# Patient Record
Sex: MALE | Race: BLACK OR AFRICAN AMERICAN | Employment: OTHER | URBAN - METROPOLITAN AREA
[De-identification: names, ages, dates, MRNs, and addresses within clinical notes are randomized per-mention and may not be internally consistent; named-entity substitution may affect disease eponyms.]

---

## 2020-10-29 ENCOUNTER — APPOINTMENT (OUTPATIENT)
Dept: GENERAL RADIOLOGY | Age: 60
DRG: 440 | End: 2020-10-29
Attending: EMERGENCY MEDICINE
Payer: COMMERCIAL

## 2020-10-29 ENCOUNTER — APPOINTMENT (OUTPATIENT)
Dept: CT IMAGING | Age: 60
DRG: 440 | End: 2020-10-29
Attending: EMERGENCY MEDICINE
Payer: COMMERCIAL

## 2020-10-29 ENCOUNTER — HOSPITAL ENCOUNTER (INPATIENT)
Age: 60
LOS: 6 days | Discharge: HOME | DRG: 440 | End: 2020-11-04
Attending: EMERGENCY MEDICINE | Admitting: HOSPITALIST
Payer: COMMERCIAL

## 2020-10-29 DIAGNOSIS — K85.90 ACUTE PANCREATITIS, UNSPECIFIED COMPLICATION STATUS, UNSPECIFIED PANCREATITIS TYPE: Primary | ICD-10-CM

## 2020-10-29 LAB
ALBUMIN SERPL-MCNC: 4.1 G/DL (ref 3.5–5)
ALBUMIN/GLOB SERPL: 1.1 {RATIO} (ref 1.1–2.2)
ALP SERPL-CCNC: 68 U/L (ref 45–117)
ALT SERPL-CCNC: 43 U/L (ref 12–78)
ANION GAP SERPL CALC-SCNC: 10 MMOL/L (ref 5–15)
APPEARANCE UR: CLEAR
AST SERPL W P-5'-P-CCNC: 32 U/L (ref 15–37)
BACTERIA URNS QL MICRO: NEGATIVE /HPF
BASOPHILS # BLD: 0 K/UL (ref 0–0.1)
BASOPHILS NFR BLD: 0 % (ref 0–1)
BILIRUB SERPL-MCNC: 0.7 MG/DL (ref 0.2–1)
BILIRUB UR QL: NEGATIVE
BUN SERPL-MCNC: 13 MG/DL (ref 6–20)
BUN/CREAT SERPL: 13 (ref 12–20)
CA-I BLD-MCNC: 9.7 MG/DL (ref 8.5–10.1)
CHLORIDE SERPL-SCNC: 104 MMOL/L (ref 97–108)
CO2 SERPL-SCNC: 27 MMOL/L (ref 21–32)
COLOR UR: ABNORMAL
CREAT SERPL-MCNC: 0.99 MG/DL (ref 0.7–1.3)
DIFFERENTIAL METHOD BLD: ABNORMAL
EOSINOPHIL # BLD: 0 K/UL (ref 0–0.4)
EOSINOPHIL NFR BLD: 0 % (ref 0–7)
EPITH CASTS URNS QL MICRO: NORMAL /LPF
ERYTHROCYTE [DISTWIDTH] IN BLOOD BY AUTOMATED COUNT: 14.3 % (ref 11.5–14.5)
GLOBULIN SER CALC-MCNC: 3.7 G/DL (ref 2–4)
GLUCOSE SERPL-MCNC: 127 MG/DL (ref 65–100)
GLUCOSE UR STRIP.AUTO-MCNC: NEGATIVE MG/DL
HCT VFR BLD AUTO: 38.1 % (ref 36.6–50.3)
HGB BLD-MCNC: 13.2 G/DL (ref 12.1–17)
HGB UR QL STRIP: NEGATIVE
IMM GRANULOCYTES # BLD AUTO: 0 K/UL (ref 0–0.04)
IMM GRANULOCYTES NFR BLD AUTO: 0 % (ref 0–0.5)
KETONES UR QL STRIP.AUTO: 15 MG/DL
LEUKOCYTE ESTERASE UR QL STRIP.AUTO: NEGATIVE
LIPASE SERPL-CCNC: >3000 U/L (ref 73–393)
LYMPHOCYTES # BLD: 2.2 K/UL (ref 0.8–3.5)
LYMPHOCYTES NFR BLD: 17 % (ref 12–49)
MCH RBC QN AUTO: 33.1 PG (ref 26–34)
MCHC RBC AUTO-ENTMCNC: 34.6 G/DL (ref 30–36.5)
MCV RBC AUTO: 95.5 FL (ref 80–99)
MONOCYTES # BLD: 0.8 K/UL (ref 0–1)
MONOCYTES NFR BLD: 6 % (ref 5–13)
NEUTS SEG # BLD: 9.9 K/UL (ref 1.8–8)
NEUTS SEG NFR BLD: 77 % (ref 32–75)
NITRITE UR QL STRIP.AUTO: NEGATIVE
PH UR STRIP: 6 [PH] (ref 5–8)
PLATELET # BLD AUTO: 213 K/UL (ref 150–400)
PMV BLD AUTO: 10.3 FL (ref 8.9–12.9)
POTASSIUM SERPL-SCNC: 4.3 MMOL/L (ref 3.5–5.1)
PROT SERPL-MCNC: 7.8 G/DL (ref 6.4–8.2)
PROT UR STRIP-MCNC: ABNORMAL MG/DL
RBC # BLD AUTO: 3.99 M/UL (ref 4.1–5.7)
RBC #/AREA URNS HPF: NORMAL /HPF (ref 0–5)
SODIUM SERPL-SCNC: 141 MMOL/L (ref 136–145)
SP GR UR REFRACTOMETRY: 1.01 (ref 1–1.03)
UROBILINOGEN UR QL STRIP.AUTO: 1 EU/DL (ref 0.2–1)
WBC # BLD AUTO: 13 K/UL (ref 4.1–11.1)
WBC URNS QL MICRO: NORMAL /HPF (ref 0–4)

## 2020-10-29 PROCEDURE — 36415 COLL VENOUS BLD VENIPUNCTURE: CPT

## 2020-10-29 PROCEDURE — 74011250636 HC RX REV CODE- 250/636: Performed by: EMERGENCY MEDICINE

## 2020-10-29 PROCEDURE — 96374 THER/PROPH/DIAG INJ IV PUSH: CPT

## 2020-10-29 PROCEDURE — 85025 COMPLETE CBC W/AUTO DIFF WBC: CPT

## 2020-10-29 PROCEDURE — 65270000029 HC RM PRIVATE

## 2020-10-29 PROCEDURE — 96375 TX/PRO/DX INJ NEW DRUG ADDON: CPT

## 2020-10-29 PROCEDURE — 74177 CT ABD & PELVIS W/CONTRAST: CPT

## 2020-10-29 PROCEDURE — 80053 COMPREHEN METABOLIC PANEL: CPT

## 2020-10-29 PROCEDURE — 83690 ASSAY OF LIPASE: CPT

## 2020-10-29 PROCEDURE — 81001 URINALYSIS AUTO W/SCOPE: CPT

## 2020-10-29 PROCEDURE — 74018 RADEX ABDOMEN 1 VIEW: CPT

## 2020-10-29 PROCEDURE — 74011000636 HC RX REV CODE- 636: Performed by: HOSPITALIST

## 2020-10-29 PROCEDURE — 99284 EMERGENCY DEPT VISIT MOD MDM: CPT

## 2020-10-29 RX ORDER — HYDROMORPHONE HYDROCHLORIDE 1 MG/ML
0.5 INJECTION, SOLUTION INTRAMUSCULAR; INTRAVENOUS; SUBCUTANEOUS ONCE
Status: COMPLETED | OUTPATIENT
Start: 2020-10-29 | End: 2020-10-29

## 2020-10-29 RX ORDER — SODIUM CHLORIDE 0.9 % (FLUSH) 0.9 %
5-40 SYRINGE (ML) INJECTION EVERY 8 HOURS
Status: DISCONTINUED | OUTPATIENT
Start: 2020-10-30 | End: 2020-10-30

## 2020-10-29 RX ORDER — HYDROMORPHONE HYDROCHLORIDE 1 MG/ML
1 INJECTION, SOLUTION INTRAMUSCULAR; INTRAVENOUS; SUBCUTANEOUS ONCE
Status: COMPLETED | OUTPATIENT
Start: 2020-10-29 | End: 2020-10-29

## 2020-10-29 RX ORDER — ONDANSETRON 2 MG/ML
4 INJECTION INTRAMUSCULAR; INTRAVENOUS
Status: COMPLETED | OUTPATIENT
Start: 2020-10-29 | End: 2020-10-29

## 2020-10-29 RX ORDER — SODIUM CHLORIDE 9 MG/ML
1000 INJECTION, SOLUTION INTRAVENOUS CONTINUOUS
Status: DISCONTINUED | OUTPATIENT
Start: 2020-10-30 | End: 2020-11-04 | Stop reason: HOSPADM

## 2020-10-29 RX ORDER — HYDROMORPHONE HYDROCHLORIDE 1 MG/ML
1 INJECTION, SOLUTION INTRAMUSCULAR; INTRAVENOUS; SUBCUTANEOUS
Status: DISCONTINUED | OUTPATIENT
Start: 2020-10-29 | End: 2020-10-30

## 2020-10-29 RX ORDER — SODIUM CHLORIDE 0.9 % (FLUSH) 0.9 %
5-40 SYRINGE (ML) INJECTION AS NEEDED
Status: DISCONTINUED | OUTPATIENT
Start: 2020-10-29 | End: 2020-10-30

## 2020-10-29 RX ORDER — ENOXAPARIN SODIUM 100 MG/ML
40 INJECTION SUBCUTANEOUS EVERY 24 HOURS
Status: DISCONTINUED | OUTPATIENT
Start: 2020-10-30 | End: 2020-11-04 | Stop reason: HOSPADM

## 2020-10-29 RX ADMIN — HYDROMORPHONE HYDROCHLORIDE 0.5 MG: 1 INJECTION, SOLUTION INTRAMUSCULAR; INTRAVENOUS; SUBCUTANEOUS at 21:37

## 2020-10-29 RX ADMIN — HYDROMORPHONE HYDROCHLORIDE 1 MG: 1 INJECTION, SOLUTION INTRAMUSCULAR; INTRAVENOUS; SUBCUTANEOUS at 19:44

## 2020-10-29 RX ADMIN — IOPAMIDOL 100 ML: 755 INJECTION, SOLUTION INTRAVENOUS at 20:49

## 2020-10-29 RX ADMIN — SODIUM CHLORIDE 1000 ML: 9 INJECTION, SOLUTION INTRAVENOUS at 19:44

## 2020-10-29 RX ADMIN — ONDANSETRON 4 MG: 2 INJECTION INTRAMUSCULAR; INTRAVENOUS at 19:44

## 2020-10-29 NOTE — ED PROVIDER NOTES
EMERGENCY DEPARTMENT HISTORY AND PHYSICAL EXAM      Date: 10/29/2020  Patient Name: Rey Patino    History of Presenting Illness     Chief Complaint   Patient presents with    Abdominal Pain    Nausea    Vomiting       History Provided By: Patient    HPI: Rey Patino, 56 y.o. male with a past medical history significant For GI bleed who presents to the ED with cc of nonspecific abdominal pain of insidious onset associated with vomiting after eating last night. He complains of abdominal  bloating and flatus this morning. No other sick contacts no other constitutional symptoms reported. No relieving or aggravating factors. There are no other complaints, changes, or physical findings at this time. PCP: UNKNOWN    No current facility-administered medications on file prior to encounter. Current Outpatient Medications on File Prior to Encounter   Medication Sig Dispense Refill    metFORMIN (GLUCOPHAGE) 500 mg tablet Take 500 mg by mouth two (2) times daily (with meals).  OMEGA-3 FATTY ACIDS (FISH OIL CONCENTRATE PO) Take 2 Tabs by mouth daily.  CYANOCOBALAMIN, VITAMIN B-12, (VITAMIN B-12 PO) Take 2 Tabs by mouth daily.  [DISCONTINUED] ascorbic acid (VITAMIN C) 500 mg tablet Take  by mouth daily.  [DISCONTINUED] calcium polycarbophil (FIBER LAXATIVE) 625 mg tablet Take 625 mg by mouth daily.  [DISCONTINUED] garlic cap Take 2 Caps by mouth daily.  [DISCONTINUED] PLANT STANOL BAILIO (CHOLEST OFF PO) Take 2 Tabs by mouth daily.  [DISCONTINUED] GLUCOSAMINE HCL/CHONDR WALLACE A NA (GLUCOSAMINE-CHONDROITIN) 750-600 mg Tab Take 2 Tabs by mouth daily.          Past History     Past Medical History:  Past Medical History:   Diagnosis Date    Other ill-defined conditions(646.22) 2009    GI bleed       Past Surgical History:  Past Surgical History:   Procedure Laterality Date    HX ORTHOPAEDIC Left     knee cartlidge removed    HX ORTHOPAEDIC Left     elbow fx repair Family History:  No family history on file. Social History:  Social History     Tobacco Use    Smoking status: Not on file   Substance Use Topics    Alcohol use: Not on file    Drug use: Not on file       Allergies: Allergies   Allergen Reactions    Motrin [Ibuprofen] Other (comments)     GI Bleed           Review of Systems     Review of Systems   Constitutional: Negative. HENT: Negative. Eyes: Negative. Respiratory: Negative. Cardiovascular: Negative. Gastrointestinal: Positive for abdominal pain and vomiting. Negative for constipation. Endocrine: Negative. Genitourinary: Negative. Musculoskeletal: Negative. Skin: Negative. Allergic/Immunologic: Negative. Neurological: Negative. Hematological: Negative. Psychiatric/Behavioral: Negative. All other systems reviewed and are negative. Physical Exam     Physical Exam  Vitals signs and nursing note reviewed. Constitutional:       General: He is not in acute distress. Appearance: He is well-developed. He is not toxic-appearing or diaphoretic. HENT:      Head: Normocephalic and atraumatic. Nose: Nose normal.      Mouth/Throat:      Mouth: Mucous membranes are moist.      Pharynx: Oropharynx is clear. Eyes:      Extraocular Movements: Extraocular movements intact. Pupils: Pupils are equal, round, and reactive to light. Neck:      Musculoskeletal: Normal range of motion and neck supple. Cardiovascular:      Rate and Rhythm: Normal rate and regular rhythm. Heart sounds: Normal heart sounds. Pulmonary:      Effort: Pulmonary effort is normal. No respiratory distress. Breath sounds: Normal breath sounds. Chest:      Chest wall: No mass or tenderness. Abdominal:      General: Bowel sounds are normal. There is no abdominal bruit. Palpations: Abdomen is soft. There is no hepatomegaly. Tenderness: There is no abdominal tenderness. There is no rebound.    Musculoskeletal: Normal range of motion. Right lower leg: He exhibits no tenderness. No edema. Left lower leg: He exhibits no tenderness. No edema. Skin:     General: Skin is warm and dry. Capillary Refill: Capillary refill takes less than 2 seconds. Neurological:      General: No focal deficit present. Mental Status: He is alert and oriented to person, place, and time. Psychiatric:         Mood and Affect: Mood normal.         Behavior: Behavior normal.         Lab and Diagnostic Study Results     Labs -     Recent Results (from the past 24 hour(s))   CBC WITH AUTOMATED DIFF    Collection Time: 10/29/20  6:30 PM   Result Value Ref Range    WBC 13.0 (H) 4.1 - 11.1 K/uL    RBC 3.99 (L) 4.10 - 5.70 M/uL    HGB 13.2 12.1 - 17.0 g/dL    HCT 38.1 36.6 - 50.3 %    MCV 95.5 80.0 - 99.0 FL    MCH 33.1 26.0 - 34.0 PG    MCHC 34.6 30.0 - 36.5 g/dL    RDW 14.3 11.5 - 14.5 %    PLATELET 748 750 - 817 K/uL    MPV 10.3 8.9 - 12.9 FL    NEUTROPHILS 77 (H) 32 - 75 %    LYMPHOCYTES 17 12 - 49 %    MONOCYTES 6 5 - 13 %    EOSINOPHILS 0 0 - 7 %    BASOPHILS 0 0 - 1 %    IMMATURE GRANULOCYTES 0 0.0 - 0.5 %    ABS. NEUTROPHILS 9.9 (H) 1.8 - 8.0 K/UL    ABS. LYMPHOCYTES 2.2 0.8 - 3.5 K/UL    ABS. MONOCYTES 0.8 0.0 - 1.0 K/UL    ABS. EOSINOPHILS 0.0 0.0 - 0.4 K/UL    ABS. BASOPHILS 0.0 0.0 - 0.1 K/UL    ABS. IMM.  GRANS. 0.0 0.00 - 0.04 K/UL    DF AUTOMATED     METABOLIC PANEL, COMPREHENSIVE    Collection Time: 10/29/20  6:30 PM   Result Value Ref Range    Sodium 141 136 - 145 mmol/L    Potassium 4.3 3.5 - 5.1 mmol/L    Chloride 104 97 - 108 mmol/L    CO2 27 21 - 32 mmol/L    Anion gap 10 5 - 15 mmol/L    Glucose 127 (H) 65 - 100 mg/dL    BUN 13 6 - 20 mg/dL    Creatinine 0.99 0.70 - 1.30 mg/dL    BUN/Creatinine ratio 13 12 - 20      GFR est AA >60 >60 ml/min/1.73m2    GFR est non-AA >60 >60 ml/min/1.73m2    Calcium 9.7 8.5 - 10.1 mg/dL    Bilirubin, total 0.7 0.2 - 1.0 mg/dL    AST (SGOT) 32 15 - 37 U/L    ALT (SGPT) 43 12 - 78 U/L    Alk. phosphatase 68 45 - 117 U/L    Protein, total 7.8 6.4 - 8.2 g/dL    Albumin 4.1 3.5 - 5.0 g/dL    Globulin 3.7 2.0 - 4.0 g/dL    A-G Ratio 1.1 1.1 - 2.2     URINALYSIS W/ RFLX MICROSCOPIC    Collection Time: 10/29/20  6:30 PM   Result Value Ref Range    Color Yellow/Straw      Appearance Clear Clear      Specific gravity 1.015 1.003 - 1.030      pH (UA) 6.0 5.0 - 8.0      Protein Trace (A) Negative mg/dL    Glucose Negative Negative mg/dL    Ketone 15 (A) Negative mg/dL    Bilirubin Negative Negative      Blood Negative Negative      Urobilinogen 1.0 0.2 - 1.0 EU/dL    Nitrites Negative Negative      Leukocyte Esterase Negative Negative     LIPASE    Collection Time: 10/29/20  6:30 PM   Result Value Ref Range    Lipase >3,000 (H) 73 - 393 U/L   URINE MICROSCOPIC    Collection Time: 10/29/20  6:30 PM   Result Value Ref Range    WBC 0-4 0 - 4 /hpf    RBC 0-5 0 - 5 /hpf    Epithelial cells Few Few /lpf    Bacteria Negative Negative /hpf     Radiologic Studies -     CT Results  (Last 48 hours)               10/29/20 2043  CT ABD PELV W CONT Final result    Impression:  IMPRESSION:       1. Findings most likely related to acute pancreatitis without findings of   complication at this time. 2. Other chronic findings, as detailed above. Narrative:  Exam: CT ABD PELV W CONT       TECHNIQUE: Multiple transaxial CT images of the abdomen and pelvis were obtained   following the uneventful administration of 100 mL Isovue 370 intravenous   contrast. Coronal and sagittal reformatted images were provided. Dose reduction: All CT scans at this facility are performed using dose reduction   optimization techniques as appropriate to a performed exam including the   following: Automated exposure control, adjustments of the mA and/or kV according   to patient size, or use of iterative reconstruction technique. COMPARISON: None       HISTORY: Acute pancreatitis       FINDINGS:       Lower thorax:  There is minimal dependent atelectasis within the visualized lung   bases. Abdomen and pelvis:   Liver: Hepatic steatosis. Gallbladder: Unremarkable. Biliary system: Nondilated. Pancreas: Peripancreatic edema with small amounts of retroperitoneal fluid. The   most prominent fluid is adjacent to small bowel at the ligament of Treitz which   does not demonstrate rim enhancement at this time. Early organization of fluid   in this location is possible, though not confirmed at this time. There are   mildly prominent peripancreatic lymph nodes that are most likely reactive. No   pancreatic ductal dilatation is evident. No pancreatic necrosis. The splenic   vein appears patent. Splenic artery also appears patent. Spleen: Calcified granulomas within the spleen. Adrenal glands: Nonspecific thickening of the adrenal glands without discrete   nodule. Kidneys and ureters: The kidneys enhance symmetrically without suspicious mass. No hydronephrosis. No hydroureter. Urinary bladder and reproductive organs: Suggestion of mild circumferential wall   thickening which is nonspecific but may be related to chronic outlet obstruction   given the enlargement of the prostate producing mass effect on the base of the   bladder. Bowel: Scattered colonic diverticula without findings of acute diverticulitis. The appendix is normal. No findings of mechanical bowel obstruction. Probable   mucosal hyperenhancement within the duodenum is favored to be reactive to the   adjacent pancreatic inflammatory process detailed above. Peritoneum: Small amount of pelvic free fluid. No pneumoperitoneum. Lymph nodes: See above for description of prominent peripancreatic lymph nodes. Aorta and other vessels: There is atherosclerosis of the abdominal aorta and its   major branches. The proximal splanchnic vessels opacify normally. Bones: Degenerative changes within the spine, hips, and pelvis.  There are nonaggressive sclerotic foci within the ribs as well as the pelvis, favored   incidental bone islands. There is a nonaggressive lucency within the right iliac   bone with thin sclerotic margins, favored to be a an incidental nonaggressive   process. No findings of acute or aggressive osseous abnormality. Superficial soft tissues: Small fat-containing periumbilical hernia. CXR Results  (Last 48 hours)    None            Medical Decision Making   I am the first provider for this patient. I reviewed the vital signs, available nursing notes, past medical history, past surgical history, family history and social history. Vital Signs-Reviewed the patient's vital signs. Patient Vitals for the past 12 hrs:   Temp Pulse Resp BP SpO2   10/30/20 0741 99 °F (37.2 °C) 73 16 (!) 143/87 97 %   10/30/20 0400 98.8 °F (37.1 °C) 84 18 133/81 94 %       Records Reviewed: Nursing Notes    ED Course:   Initial assessment performed. The patients presenting problems have been discussed, and they are in agreement with the care plan formulated and outlined with them. I have encouraged them to ask questions as they arise throughout their visit. Provider Notes (Medical Decision Making):   Patient will be admitted to the hospital inpatient medical unit, Dr. Chito Martin service for further evaluation and treatment. For acute pancreatitis. CT abdomen pelvis is pending at this time and patient is signed out to Dr. Ira Lundberg to follow-up on CT report. Disposition   Disposition: Admitted to Floor medical the case was discussed with the admitting physician Dr Jackeline Porter. DISCHARGE PLAN:  1. Current Discharge Medication List      CONTINUE these medications which have NOT CHANGED    Details   OMEGA-3 FATTY ACIDS (FISH OIL CONCENTRATE PO) Take 2 Tabs by mouth daily. ascorbic acid (VITAMIN C) 500 mg tablet Take  by mouth daily.       calcium polycarbophil (FIBER LAXATIVE) 625 mg tablet Take 625 mg by mouth daily.      garlic cap Take 2 Caps by mouth daily. PLANT STANOL ABILIO (CHOLEST OFF PO) Take 2 Tabs by mouth daily. GLUCOSAMINE HCL/CHONDR WALLACE A NA (GLUCOSAMINE-CHONDROITIN) 750-600 mg Tab Take 2 Tabs by mouth daily. CYANOCOBALAMIN, VITAMIN B-12, (VITAMIN B-12 PO) Take 2 Tabs by mouth daily. 2.   Follow-up Information    None       3. Return to ED if worse   4. Current Discharge Medication List          Diagnosis     Clinical Impression:   1. Acute pancreatitis, unspecified complication status, unspecified pancreatitis type        Attestations:    Puma Vivas MD    Please note that this dictation was completed with [employer], the computer voice recognition software. Quite often unanticipated grammatical, syntax, homophones, and other interpretive errors are inadvertently transcribed by the computer software. Please disregard these errors. Please excuse any errors that have escaped final proofreading. Thank you.

## 2020-10-29 NOTE — ED TRIAGE NOTES
Patient arrives for complaints of diffuse abdominal pain with nausea, back pain and three episodes of vomiting today. Patient ate some shrimp and \"potted meat\" yesterday evening which he believes may be contributing to his pain. Hx of prediabetes, high cholesterol and hypertension.

## 2020-10-30 LAB
ALBUMIN SERPL-MCNC: 3.6 G/DL (ref 3.5–5)
ALBUMIN/GLOB SERPL: 1.1 {RATIO} (ref 1.1–2.2)
ALP SERPL-CCNC: 62 U/L (ref 45–117)
ALT SERPL-CCNC: 26 U/L (ref 12–78)
ANION GAP SERPL CALC-SCNC: 6 MMOL/L (ref 5–15)
AST SERPL W P-5'-P-CCNC: 16 U/L (ref 15–37)
BILIRUB SERPL-MCNC: 0.7 MG/DL (ref 0.2–1)
BUN SERPL-MCNC: 10 MG/DL (ref 6–20)
BUN/CREAT SERPL: 10 (ref 12–20)
CA-I BLD-MCNC: 8.4 MG/DL (ref 8.5–10.1)
CHLORIDE SERPL-SCNC: 105 MMOL/L (ref 97–108)
CO2 SERPL-SCNC: 28 MMOL/L (ref 21–32)
CREAT SERPL-MCNC: 0.97 MG/DL (ref 0.7–1.3)
ERYTHROCYTE [DISTWIDTH] IN BLOOD BY AUTOMATED COUNT: 14.8 % (ref 11.5–14.5)
GLOBULIN SER CALC-MCNC: 3.4 G/DL (ref 2–4)
GLUCOSE SERPL-MCNC: 152 MG/DL (ref 65–100)
HCT VFR BLD AUTO: 36.7 % (ref 36.6–50.3)
HGB BLD-MCNC: 12.3 G/DL (ref 12.1–17)
LIPASE SERPL-CCNC: 875 U/L (ref 73–393)
MAGNESIUM SERPL-MCNC: 1.6 MG/DL (ref 1.6–2.4)
MCH RBC QN AUTO: 32.4 PG (ref 26–34)
MCHC RBC AUTO-ENTMCNC: 33.5 G/DL (ref 30–36.5)
MCV RBC AUTO: 96.6 FL (ref 80–99)
PHOSPHATE SERPL-MCNC: 2.4 MG/DL (ref 2.6–4.7)
PLATELET # BLD AUTO: 177 K/UL (ref 150–400)
PMV BLD AUTO: 11 FL (ref 8.9–12.9)
POTASSIUM SERPL-SCNC: 3.7 MMOL/L (ref 3.5–5.1)
PROT SERPL-MCNC: 7 G/DL (ref 6.4–8.2)
RBC # BLD AUTO: 3.8 M/UL (ref 4.1–5.7)
SODIUM SERPL-SCNC: 139 MMOL/L (ref 136–145)
WBC # BLD AUTO: 11.6 K/UL (ref 4.1–11.1)

## 2020-10-30 PROCEDURE — 83690 ASSAY OF LIPASE: CPT

## 2020-10-30 PROCEDURE — 80053 COMPREHEN METABOLIC PANEL: CPT

## 2020-10-30 PROCEDURE — 83735 ASSAY OF MAGNESIUM: CPT

## 2020-10-30 PROCEDURE — 85027 COMPLETE CBC AUTOMATED: CPT

## 2020-10-30 PROCEDURE — 65270000029 HC RM PRIVATE

## 2020-10-30 PROCEDURE — 74011000258 HC RX REV CODE- 258: Performed by: HOSPITALIST

## 2020-10-30 PROCEDURE — 36415 COLL VENOUS BLD VENIPUNCTURE: CPT

## 2020-10-30 PROCEDURE — 84100 ASSAY OF PHOSPHORUS: CPT

## 2020-10-30 PROCEDURE — 80061 LIPID PANEL: CPT

## 2020-10-30 PROCEDURE — 74011250636 HC RX REV CODE- 250/636: Performed by: HOSPITALIST

## 2020-10-30 RX ORDER — METFORMIN HYDROCHLORIDE 500 MG/1
500 TABLET ORAL 2 TIMES DAILY WITH MEALS
COMMUNITY

## 2020-10-30 RX ORDER — HYDROMORPHONE HYDROCHLORIDE 1 MG/ML
1 INJECTION, SOLUTION INTRAMUSCULAR; INTRAVENOUS; SUBCUTANEOUS
Status: DISPENSED | OUTPATIENT
Start: 2020-10-30 | End: 2020-11-01

## 2020-10-30 RX ADMIN — Medication 10 ML: at 01:01

## 2020-10-30 RX ADMIN — CEFTRIAXONE SODIUM 1 G: 1 INJECTION, POWDER, FOR SOLUTION INTRAMUSCULAR; INTRAVENOUS at 23:48

## 2020-10-30 RX ADMIN — CEFTRIAXONE SODIUM 1 G: 1 INJECTION, POWDER, FOR SOLUTION INTRAMUSCULAR; INTRAVENOUS at 01:30

## 2020-10-30 RX ADMIN — HYDROMORPHONE HYDROCHLORIDE 1 MG: 1 INJECTION, SOLUTION INTRAMUSCULAR; INTRAVENOUS; SUBCUTANEOUS at 17:40

## 2020-10-30 RX ADMIN — HYDROMORPHONE HYDROCHLORIDE 1 MG: 1 INJECTION, SOLUTION INTRAMUSCULAR; INTRAVENOUS; SUBCUTANEOUS at 08:11

## 2020-10-30 RX ADMIN — Medication 10 ML: at 13:32

## 2020-10-30 RX ADMIN — Medication 10 ML: at 06:21

## 2020-10-30 RX ADMIN — SODIUM CHLORIDE 1000 ML: 9 INJECTION, SOLUTION INTRAVENOUS at 17:40

## 2020-10-30 RX ADMIN — SODIUM CHLORIDE 1000 ML: 9 INJECTION, SOLUTION INTRAVENOUS at 01:06

## 2020-10-30 RX ADMIN — HYDROMORPHONE HYDROCHLORIDE 1 MG: 1 INJECTION, SOLUTION INTRAMUSCULAR; INTRAVENOUS; SUBCUTANEOUS at 22:10

## 2020-10-30 RX ADMIN — ENOXAPARIN SODIUM 40 MG: 40 INJECTION SUBCUTANEOUS at 06:22

## 2020-10-30 RX ADMIN — HYDROMORPHONE HYDROCHLORIDE 1 MG: 1 INJECTION, SOLUTION INTRAMUSCULAR; INTRAVENOUS; SUBCUTANEOUS at 13:31

## 2020-10-30 RX ADMIN — SODIUM CHLORIDE 1000 ML: 9 INJECTION, SOLUTION INTRAVENOUS at 09:33

## 2020-10-30 RX ADMIN — HYDROMORPHONE HYDROCHLORIDE 1 MG: 1 INJECTION, SOLUTION INTRAMUSCULAR; INTRAVENOUS; SUBCUTANEOUS at 02:54

## 2020-10-30 NOTE — PROGRESS NOTES
Hospitalist Progress Note    Subjective:   Daily Progress Note: 10/30/2020 2:37 PM    Hospital Course:   Admitted 10/29/2020 patient is 69-year-old male with a PMH significant for prediabetes, and HLD. He comes into the emergency room with diffuse abdominal pain nausea and vomiting. Denies abusive alcohol consumption. Associated symptoms of bloated gassy feeling without bowel movement. Significant labs on admission WBC count 13 lipase 3000 KUB revealing gas filled loops appear nondilated concerning for acute abdominal process. CT of abdomen findings are consistent with pancreatitis. Admitted for further management and evaluation of acute pancreatitis unknown etiology. GI consulted. IV fluids initiated, IV antibiotics and clear liquid diet. IV pain medication and IV antiemetics. Subjective:  Examined patient at the bedside. Continues to complain of abdominal discomfort. He states his pain is well managed with current pain medication regimen. Current Facility-Administered Medications   Medication Dose Route Frequency    HYDROmorphone (DILAUDID) injection 1 mg  1 mg IntraVENous Q4H PRN    0.9% sodium chloride infusion 1,000 mL  1,000 mL IntraVENous CONTINUOUS    sodium chloride (NS) flush 5-40 mL  5-40 mL IntraVENous Q8H    sodium chloride (NS) flush 5-40 mL  5-40 mL IntraVENous PRN    cefTRIAXone (ROCEPHIN) 1 g in 0.9% sodium chloride (MBP/ADV) 50 mL MBP  1 g IntraVENous Q24H    enoxaparin (LOVENOX) injection 40 mg  40 mg SubCUTAneous Q24H        REVIEW OF SYSTEMS    Review of Systems   Constitutional: Positive for malaise/fatigue. Eyes: Negative. Respiratory: Negative. Cardiovascular: Negative. Gastrointestinal: Positive for abdominal pain, nausea and vomiting. Neurological: Positive for weakness.         Objective:     Visit Vitals  BP (!) 143/87 (BP 1 Location: Right arm)   Pulse 73   Temp 99 °F (37.2 °C)   Resp 16   Ht 6' 1\" (1.854 m)   Wt 116.1 kg (256 lb)   SpO2 97%   BMI 33.78 kg/m²      O2 Device: Room air    Temp (24hrs), Av.2 °F (37.3 °C), Min:98.8 °F (37.1 °C), Max:99.6 °F (37.6 °C)      No intake/output data recorded. 10/28 1901 - 10/30 0700  In: 1000 [I.V.:1000]  Out: -     PHYSICAL EXAM:    Physical Exam  Constitutional:       Appearance: He is obese. Eyes:      Pupils: Pupils are equal, round, and reactive to light. Neck:      Musculoskeletal: Normal range of motion. Cardiovascular:      Rate and Rhythm: Normal rate and regular rhythm. Pulmonary:      Effort: Pulmonary effort is normal.   Abdominal:      General: There is distension. Tenderness: There is abdominal tenderness. There is guarding. Skin:     General: Skin is dry. Neurological:      General: No focal deficit present. Mental Status: He is alert. Data Review    Recent Results (from the past 24 hour(s))   CBC WITH AUTOMATED DIFF    Collection Time: 10/29/20  6:30 PM   Result Value Ref Range    WBC 13.0 (H) 4.1 - 11.1 K/uL    RBC 3.99 (L) 4.10 - 5.70 M/uL    HGB 13.2 12.1 - 17.0 g/dL    HCT 38.1 36.6 - 50.3 %    MCV 95.5 80.0 - 99.0 FL    MCH 33.1 26.0 - 34.0 PG    MCHC 34.6 30.0 - 36.5 g/dL    RDW 14.3 11.5 - 14.5 %    PLATELET 815 413 - 786 K/uL    MPV 10.3 8.9 - 12.9 FL    NEUTROPHILS 77 (H) 32 - 75 %    LYMPHOCYTES 17 12 - 49 %    MONOCYTES 6 5 - 13 %    EOSINOPHILS 0 0 - 7 %    BASOPHILS 0 0 - 1 %    IMMATURE GRANULOCYTES 0 0.0 - 0.5 %    ABS. NEUTROPHILS 9.9 (H) 1.8 - 8.0 K/UL    ABS. LYMPHOCYTES 2.2 0.8 - 3.5 K/UL    ABS. MONOCYTES 0.8 0.0 - 1.0 K/UL    ABS. EOSINOPHILS 0.0 0.0 - 0.4 K/UL    ABS. BASOPHILS 0.0 0.0 - 0.1 K/UL    ABS. IMM.  GRANS. 0.0 0.00 - 0.04 K/UL    DF AUTOMATED     METABOLIC PANEL, COMPREHENSIVE    Collection Time: 10/29/20  6:30 PM   Result Value Ref Range    Sodium 141 136 - 145 mmol/L    Potassium 4.3 3.5 - 5.1 mmol/L    Chloride 104 97 - 108 mmol/L    CO2 27 21 - 32 mmol/L    Anion gap 10 5 - 15 mmol/L    Glucose 127 (H) 65 - 100 mg/dL    BUN 13 6 - 20 mg/dL    Creatinine 0.99 0.70 - 1.30 mg/dL    BUN/Creatinine ratio 13 12 - 20      GFR est AA >60 >60 ml/min/1.73m2    GFR est non-AA >60 >60 ml/min/1.73m2    Calcium 9.7 8.5 - 10.1 mg/dL    Bilirubin, total 0.7 0.2 - 1.0 mg/dL    AST (SGOT) 32 15 - 37 U/L    ALT (SGPT) 43 12 - 78 U/L    Alk. phosphatase 68 45 - 117 U/L    Protein, total 7.8 6.4 - 8.2 g/dL    Albumin 4.1 3.5 - 5.0 g/dL    Globulin 3.7 2.0 - 4.0 g/dL    A-G Ratio 1.1 1.1 - 2.2     URINALYSIS W/ RFLX MICROSCOPIC    Collection Time: 10/29/20  6:30 PM   Result Value Ref Range    Color Yellow/Straw      Appearance Clear Clear      Specific gravity 1.015 1.003 - 1.030      pH (UA) 6.0 5.0 - 8.0      Protein Trace (A) Negative mg/dL    Glucose Negative Negative mg/dL    Ketone 15 (A) Negative mg/dL    Bilirubin Negative Negative      Blood Negative Negative      Urobilinogen 1.0 0.2 - 1.0 EU/dL    Nitrites Negative Negative      Leukocyte Esterase Negative Negative     LIPASE    Collection Time: 10/29/20  6:30 PM   Result Value Ref Range    Lipase >3,000 (H) 73 - 393 U/L   URINE MICROSCOPIC    Collection Time: 10/29/20  6:30 PM   Result Value Ref Range    WBC 0-4 0 - 4 /hpf    RBC 0-5 0 - 5 /hpf    Epithelial cells Few Few /lpf    Bacteria Negative Negative /hpf       CT ABD PELV W CONT   Final Result   IMPRESSION:      1. Findings most likely related to acute pancreatitis without findings of   complication at this time. 2. Other chronic findings, as detailed above. XR ABD (KUB)   Final Result   IMPRESSION:   1. Relative paucity of bowel gas with gas filled loops appearing nondilated. If   there is persistent clinical concern for an acute abdominal process, further   evaluation with CT should be considered. Active Problems:    Acute pancreatitis (10/29/2020)        Assessment/Plan:     1. Acute abdominal pain:  2. Nausea and vomiting:  3.   Acute pancreatitis: Unknown etiology  · Lipase 3000 on admission  · CT confirming pancreatitis  · GI consulted  · IV ceftriaxone, IV fluids, IV antiemetics and IV pain medications  · clear liquid diet    4. DM:  · We will hold home medication metformin for now  · Accu-Cheks with SSI    5.  HLD:   · We will hold for now    6. History of diverticulosis:   · No signs and symptoms of GI bleed at this time        DVT Prophylaxis: Lovenox  Code Status: Full Code  POA/NOK:  ______________________________________________________________________________  Time spent in direct care including coordination of service, review of data and examination: > 35 minutes  Care Plan discussed with:   ______________________________________________________________________________    Mariel Gayle NP    This is dictation was done by [employer], computer voice recognition software. Quite often unanticipated grammatical, syntax, homophones and other interpretive errors or inadvertently transcribed by the computer software. Please excuse errors that have escaped final proofreading. Thank you.

## 2020-10-30 NOTE — H&P
History and Physical              Subjective :   Chief Complaint : Abdominal pain. Found with acute pancreatitis. Source of information : Patient, ED provider. History of present illness:   56 y.o. male history of prediabetes on treatment, hyperlipidemia presents to the emergency room complaining of abdominal pain started in the epigastric area and then diffuse this morning when he woke up. Denies anything in particular trigger, but he thinks may be related to something he ate. Since then unable to keep anything down, nausea and vomiting. Admits to drinking alcohol occasionally and did have 2 beers yesterday night. No fever or chills. Feels gassy and bloated, but feels like he is unable to pass. Denies any previous history. Past Medical History:   Diagnosis Date    Other ill-defined conditions(281.23)     GI bleed   Diverticulosis. Past Surgical History:   Procedure Laterality Date    HX ORTHOPAEDIC Left     knee cartlidge removed    HX ORTHOPAEDIC Left     elbow fx repair   Family history:  Father:  of some medication issues, but states that he has fluid issues, states does not know much about him. Mother:  of cancer, not sure what kind  Sister had breast cancer    Social history: Does not smoke, drink alcohol 2-3 times a week. Lives at home with family, independent with activity. Prior to Admission medications    Medication Sig Start Date End Date Taking? Authorizing Provider   OMEGA-3 FATTY ACIDS (FISH OIL CONCENTRATE PO) Take 2 Tabs by mouth daily. Yes Provider, Historical   ascorbic acid (VITAMIN C) 500 mg tablet Take  by mouth daily. Yes Provider, Historical   calcium polycarbophil (FIBER LAXATIVE) 625 mg tablet Take 625 mg by mouth daily. Yes Provider, Historical   garlic cap Take 2 Caps by mouth daily. Yes Provider, Historical   PLANT STANOL ABILIO (CHOLEST OFF PO) Take 2 Tabs by mouth daily.    Yes Provider, Historical   GLUCOSAMINE HCL/CHONDR WALLACE A NA (GLUCOSAMINE-CHONDROITIN) 750-600 mg Tab Take 2 Tabs by mouth daily. Yes Provider, Historical   CYANOCOBALAMIN, VITAMIN B-12, (VITAMIN B-12 PO) Take 2 Tabs by mouth daily. Yes Provider, Historical     Allergies   Allergen Reactions    Motrin [Ibuprofen] Other (comments)     GI Bleed               Review of Systems:  Constitutional: Appetite is usually good, denies weight loss, no fever, no chills, no night sweats. Eye: No recent visual disturbances, no discharge, no double vision. Ear/nose/mouth/throat : No hearing disturbance, no ear pain, no nasal congestion, no sore throat, no trouble swallowing. Respiratory : No trouble breathing, no cough, no shortness of breath, no hemoptysis, no wheezing. Cardiovascular : No chest pain, no palpitation, no racing of heart, no orthopnea, no paroxysmal nocturnal dyspnea, no peripheral edema. Gastrointestinal : As in history of present illness. Genitourinary : Admits weak stream and frequency and nocturia. No dysuria or hematuria. .  Lymphatics : No swollen glands -Neck, axillary, inguinal.  Endocrine : No excessive thirst, No polyuria No cold intolerance, No heat intolerance. Immunologic : No hives, urticaria, No seasonal allergies. Musculoskeletal : No joint swelling, No pain, No effusion,  No back pain, No neck pain. Integumentary : No rash, No pruritus, No ecchymosis. Hematology : No petechiae, No easy bruising,  No tendency to bleed easy. Neurology : Denies change in mental status, No abnormal balance, No headache, No confusion, No numbness or tingling. Psychiatric : No mood swings, No anxiety, No depression. Vitals:     Patient Vitals for the past 12 hrs:   Temp Pulse Resp BP SpO2   10/29/20 2120  91 16 (!) 175/96 95 %   10/29/20 1728 99.2 °F (37.3 °C) 91 18 (!) 155/88 99 %       Physical Exam:   General : Obese, looks comfortable, no respiratory distress noted. HEENT : PERRLA, normal oral mucosa, atraumatic normocephalic, Normal ear and nose.   Neck : Supple, no JVD, no masses noted, no carotid bruit. Lungs : Breath sounds with good air entry bilaterally, no wheezes or rales, no accessory muscle use. CVS : Rhythm rate regular, S1+, S2+, no murmur or gallop. Abdomen : Obese, slightly distended, it is tight trying to examine. No tenderness. Bowel sounds present. Extremities : No edema noted,  pedal pulses palpable. Musculoskeletal : Fair range of motion, no joint swelling or effusion, muscle tone and power appears normal.   Skin : Moist, warm,  no pathological rash. Lymphatic : No cervical lymphadenopathy. Neurological : Awake, alert, oriented to time place person. No neurological deficits. Psychiatric : Mood and affect appears appropriate to the situation. Data Review:   Recent Results (from the past 24 hour(s))   CBC WITH AUTOMATED DIFF    Collection Time: 10/29/20  6:30 PM   Result Value Ref Range    WBC 13.0 (H) 4.1 - 11.1 K/uL    RBC 3.99 (L) 4.10 - 5.70 M/uL    HGB 13.2 12.1 - 17.0 g/dL    HCT 38.1 36.6 - 50.3 %    MCV 95.5 80.0 - 99.0 FL    MCH 33.1 26.0 - 34.0 PG    MCHC 34.6 30.0 - 36.5 g/dL    RDW 14.3 11.5 - 14.5 %    PLATELET 551 423 - 632 K/uL    MPV 10.3 8.9 - 12.9 FL    NEUTROPHILS 77 (H) 32 - 75 %    LYMPHOCYTES 17 12 - 49 %    MONOCYTES 6 5 - 13 %    EOSINOPHILS 0 0 - 7 %    BASOPHILS 0 0 - 1 %    IMMATURE GRANULOCYTES 0 0.0 - 0.5 %    ABS. NEUTROPHILS 9.9 (H) 1.8 - 8.0 K/UL    ABS. LYMPHOCYTES 2.2 0.8 - 3.5 K/UL    ABS. MONOCYTES 0.8 0.0 - 1.0 K/UL    ABS. EOSINOPHILS 0.0 0.0 - 0.4 K/UL    ABS. BASOPHILS 0.0 0.0 - 0.1 K/UL    ABS. IMM.  GRANS. 0.0 0.00 - 0.04 K/UL    DF AUTOMATED     METABOLIC PANEL, COMPREHENSIVE    Collection Time: 10/29/20  6:30 PM   Result Value Ref Range    Sodium 141 136 - 145 mmol/L    Potassium 4.3 3.5 - 5.1 mmol/L    Chloride 104 97 - 108 mmol/L    CO2 27 21 - 32 mmol/L    Anion gap 10 5 - 15 mmol/L    Glucose 127 (H) 65 - 100 mg/dL    BUN 13 6 - 20 mg/dL    Creatinine 0.99 0.70 - 1.30 mg/dL BUN/Creatinine ratio 13 12 - 20      GFR est AA >60 >60 ml/min/1.73m2    GFR est non-AA >60 >60 ml/min/1.73m2    Calcium 9.7 8.5 - 10.1 mg/dL    Bilirubin, total 0.7 0.2 - 1.0 mg/dL    AST (SGOT) 32 15 - 37 U/L    ALT (SGPT) 43 12 - 78 U/L    Alk. phosphatase 68 45 - 117 U/L    Protein, total 7.8 6.4 - 8.2 g/dL    Albumin 4.1 3.5 - 5.0 g/dL    Globulin 3.7 2.0 - 4.0 g/dL    A-G Ratio 1.1 1.1 - 2.2     URINALYSIS W/ RFLX MICROSCOPIC    Collection Time: 10/29/20  6:30 PM   Result Value Ref Range    Color Yellow/Straw      Appearance Clear Clear      Specific gravity 1.015 1.003 - 1.030      pH (UA) 6.0 5.0 - 8.0      Protein Trace (A) Negative mg/dL    Glucose Negative Negative mg/dL    Ketone 15 (A) Negative mg/dL    Bilirubin Negative Negative      Blood Negative Negative      Urobilinogen 1.0 0.2 - 1.0 EU/dL    Nitrites Negative Negative      Leukocyte Esterase Negative Negative     LIPASE    Collection Time: 10/29/20  6:30 PM   Result Value Ref Range    Lipase >3,000 (H) 73 - 393 U/L   URINE MICROSCOPIC    Collection Time: 10/29/20  6:30 PM   Result Value Ref Range    WBC 0-4 0 - 4 /hpf    RBC 0-5 0 - 5 /hpf    Epithelial cells Few Few /lpf    Bacteria Negative Negative /hpf       Radiologic Studies :     CT of the abdomen and pelvis:     1. Findings most likely related to acute pancreatitis without findings of  complication at this time. 2. Other chronic findings, as detailed above. Assessment and Plan :   Acute pancreatitis: Etiology unclear, no biliary evidence. He is not big-time alcoholic. Need to look for other etiologies like hypertriglyceridemia or medication side effects. We will keep him on clear liquid diet, consultation placed to gastroenterology and will follow with recommendations    Diabetes mellitus type 2 controlled:  On Metformin which I am going to hold for now, keep him on Accu-Cheks with a sliding scale insulin    Hyperlipidemia: He does not remember the name of the medication    History of diverticulosis and GI bleed, no issues at this time. Admitted to medical floor, full CODE STATUS, home medications reviewed with patient. Has no advance medical directives, states his spouse will make decisions. CC : Dr. Shahla Garcia By: Parris Mendoza MD     October 29, 2020      This dictation was done by dragon, computer voice recognition software. Often unanticipated grammatical, syntax, [place] phones and other interpretive errors are inadvertently transcribed. Please excuse errors that have escaped final proofreading.

## 2020-10-30 NOTE — PROGRESS NOTES
2230- Patient admitted to [address] [place] [address]. Patient was brought to [facility] emergency room via ambulance. Patient alert and oriented x4, complaining of nausea and vomiting since last evening. Patient complains of pain in upper abdomen. Skin assessment assessed with myself and Tennille Dotson RN. Skin intact no redden areas noted or broken areas. Patient has a #18 G. LAC with NS infusing at 125 ml/hr.

## 2020-10-30 NOTE — CONSULTS
Gastroenterology Consult     Referring Physician: UNKNOWN     Consult Date: 10/30/2020     Subjective:     Chief Complaint: Abdominal pain    History of Present Illness: Betty France is a 56 y.o. male who is seen in consultation for abdominal pain and acute pancreatitis  Patient was admitted to the hospital with complaints of epigastric and upper abdominal pain for the past 24 hours pain was severe in intensity associated with nausea patient admits to drinking every day and drinks 2-3 shots of [place] whiskey. He denies any fever or chills he has not lost any weight. He denies any hematemesis or blood in the stool a consult was placed to evaluate for acute pancreatitis his imaging shows pancreatitis and his pancreatic enzymes are elevated his liver enzymes are normal.  At least on the CAT scan there is no dilatation of the biliary system or any gallstones. Past Medical History:   Diagnosis Date    Other ill-defined conditions(798.87) 2009    GI bleed     Past Surgical History:   Procedure Laterality Date    HX ORTHOPAEDIC Left     knee cartlidge removed    HX ORTHOPAEDIC Left     elbow fx repair      No family history on file.   Social History     Tobacco Use    Smoking status: Not on file   Substance Use Topics    Alcohol use: Not on file      Allergies   Allergen Reactions    Motrin [Ibuprofen] Other (comments)     GI Bleed       Current Facility-Administered Medications   Medication Dose Route Frequency    HYDROmorphone (DILAUDID) injection 1 mg  1 mg IntraVENous Q4H PRN    0.9% sodium chloride infusion 1,000 mL  1,000 mL IntraVENous CONTINUOUS    sodium chloride (NS) flush 5-40 mL  5-40 mL IntraVENous Q8H    sodium chloride (NS) flush 5-40 mL  5-40 mL IntraVENous PRN    cefTRIAXone (ROCEPHIN) 1 g in 0.9% sodium chloride (MBP/ADV) 50 mL MBP  1 g IntraVENous Q24H    enoxaparin (LOVENOX) injection 40 mg  40 mg SubCUTAneous Q24H        Review of Systems:  A detailed 10 organ review of systems is obtained with pertinent positives as listed in the History of Present Illness and Past Medical History. All others are negative. Objective:     Physical Exam:  Visit Vitals  BP (!) 159/83   Pulse 83   Temp 98.9 °F (37.2 °C)   Resp 18   Ht 6' 1\" (1.854 m)   Wt 116.1 kg (256 lb)   SpO2 95%   BMI 33.78 kg/m²        Skin:  Extremities and face reveal no rashes. No martin erythema. No telangiectasias on the chest wall. HEENT: Sclerae anicteric. Extra-occular muscles are intact. No oral ulcers. No abnormal pigmentation of the lips. The neck is supple. Cardiovascular: Regular rate and rhythm. Respiratory:  Comfortable breathing with no accessory muscle use. GI:  Abdomen nondistended, soft, and nontender. Normal active bowel sounds. No enlargement of the liver or spleen. No masses palpable. Rectal:  Deferred  Musculoskeletal: Extremities have good range of motion. Neurological:  Gross memory appears intact. Patient is alert and oriented. Psychiatric:  Mood appears appropriate with judgement intact. Lymphatic:  No cervical or supraclavicular adenopathy. Lab/Data Review:  CMP:   Lab Results   Component Value Date/Time     10/29/2020 06:30 PM    K 4.3 10/29/2020 06:30 PM     10/29/2020 06:30 PM    CO2 27 10/29/2020 06:30 PM    AGAP 10 10/29/2020 06:30 PM     (H) 10/29/2020 06:30 PM    BUN 13 10/29/2020 06:30 PM    CREA 0.99 10/29/2020 06:30 PM    GFRAA >60 10/29/2020 06:30 PM    GFRNA >60 10/29/2020 06:30 PM    CA 9.7 10/29/2020 06:30 PM    ALB 4.1 10/29/2020 06:30 PM    TP 7.8 10/29/2020 06:30 PM    GLOB 3.7 10/29/2020 06:30 PM    AGRAT 1.1 10/29/2020 06:30 PM    ALT 43 10/29/2020 06:30 PM     CBC:   Lab Results   Component Value Date/Time    WBC 13.0 (H) 10/29/2020 06:30 PM    HGB 13.2 10/29/2020 06:30 PM    HCT 38.1 10/29/2020 06:30 PM     10/29/2020 06:30 PM         Assessment/Plan:     1.  Acute pancreatitis, unspecified complication status, unspecified pancreatitis type  Most likely secondary to excessive alcohol consumption. I would recommend symptomatic care with IV fluids pain management antiemetics and if there are any signs of infection antibiotic therapy. Patient should be kept n.p.o. till pancreatitis settles  He will also need an ultrasound of the gallbladder and endoscopic ultrasound of the pancreas as an outpatient once acute pancreatitis settles down.          Thank you for allowing me to participate in this patients care

## 2020-10-31 LAB
ANION GAP SERPL CALC-SCNC: 8 MMOL/L (ref 5–15)
BASOPHILS # BLD: 0 K/UL (ref 0–0.1)
BASOPHILS NFR BLD: 0 % (ref 0–1)
BUN SERPL-MCNC: 7 MG/DL (ref 6–20)
BUN/CREAT SERPL: 9 (ref 12–20)
CA-I BLD-MCNC: 8.3 MG/DL (ref 8.5–10.1)
CHLORIDE SERPL-SCNC: 106 MMOL/L (ref 97–108)
CHOLEST SERPL-MCNC: 130 MG/DL
CO2 SERPL-SCNC: 25 MMOL/L (ref 21–32)
CREAT SERPL-MCNC: 0.81 MG/DL (ref 0.7–1.3)
DIFFERENTIAL METHOD BLD: ABNORMAL
EOSINOPHIL # BLD: 0.1 K/UL (ref 0–0.4)
EOSINOPHIL NFR BLD: 1 % (ref 0–7)
ERYTHROCYTE [DISTWIDTH] IN BLOOD BY AUTOMATED COUNT: 14.3 % (ref 11.5–14.5)
GLUCOSE SERPL-MCNC: 108 MG/DL (ref 65–100)
HCT VFR BLD AUTO: 35.2 % (ref 36.6–50.3)
HDLC SERPL-MCNC: 40 MG/DL
HDLC SERPL: 3.3 {RATIO} (ref 0–5)
HGB BLD-MCNC: 11.9 G/DL (ref 12.1–17)
IMM GRANULOCYTES # BLD AUTO: 0 K/UL (ref 0–0.04)
IMM GRANULOCYTES NFR BLD AUTO: 0 % (ref 0–0.5)
LDLC SERPL CALC-MCNC: 62 MG/DL (ref 0–100)
LIPASE SERPL-CCNC: 669 U/L (ref 73–393)
LIPID PROFILE,FLP: NORMAL
LYMPHOCYTES # BLD: 1.7 K/UL (ref 0.8–3.5)
LYMPHOCYTES NFR BLD: 14 % (ref 12–49)
MCH RBC QN AUTO: 32.7 PG (ref 26–34)
MCHC RBC AUTO-ENTMCNC: 33.8 G/DL (ref 30–36.5)
MCV RBC AUTO: 96.7 FL (ref 80–99)
MONOCYTES # BLD: 1.2 K/UL (ref 0–1)
MONOCYTES NFR BLD: 10 % (ref 5–13)
NEUTS SEG # BLD: 8.8 K/UL (ref 1.8–8)
NEUTS SEG NFR BLD: 75 % (ref 32–75)
PLATELET # BLD AUTO: 169 K/UL (ref 150–400)
PMV BLD AUTO: 10.8 FL (ref 8.9–12.9)
POTASSIUM SERPL-SCNC: 3.5 MMOL/L (ref 3.5–5.1)
RBC # BLD AUTO: 3.64 M/UL (ref 4.1–5.7)
SODIUM SERPL-SCNC: 139 MMOL/L (ref 136–145)
TRIGL SERPL-MCNC: 140 MG/DL (ref ?–150)
VLDLC SERPL CALC-MCNC: 28 MG/DL
WBC # BLD AUTO: 11.8 K/UL (ref 4.1–11.1)

## 2020-10-31 PROCEDURE — 74011000258 HC RX REV CODE- 258: Performed by: HOSPITALIST

## 2020-10-31 PROCEDURE — 74011250636 HC RX REV CODE- 250/636: Performed by: HOSPITALIST

## 2020-10-31 PROCEDURE — 83690 ASSAY OF LIPASE: CPT

## 2020-10-31 PROCEDURE — 65270000029 HC RM PRIVATE

## 2020-10-31 PROCEDURE — 85025 COMPLETE CBC W/AUTO DIFF WBC: CPT

## 2020-10-31 PROCEDURE — 36415 COLL VENOUS BLD VENIPUNCTURE: CPT

## 2020-10-31 PROCEDURE — 74011250637 HC RX REV CODE- 250/637: Performed by: NURSE PRACTITIONER

## 2020-10-31 PROCEDURE — 80048 BASIC METABOLIC PNL TOTAL CA: CPT

## 2020-10-31 RX ADMIN — LACTULOSE 30 ML: 20 SOLUTION ORAL at 19:29

## 2020-10-31 RX ADMIN — HYDROMORPHONE HYDROCHLORIDE 1 MG: 1 INJECTION, SOLUTION INTRAMUSCULAR; INTRAVENOUS; SUBCUTANEOUS at 13:17

## 2020-10-31 RX ADMIN — HYDROMORPHONE HYDROCHLORIDE 1 MG: 1 INJECTION, SOLUTION INTRAMUSCULAR; INTRAVENOUS; SUBCUTANEOUS at 21:04

## 2020-10-31 RX ADMIN — CEFTRIAXONE SODIUM 1 G: 1 INJECTION, POWDER, FOR SOLUTION INTRAMUSCULAR; INTRAVENOUS at 23:35

## 2020-10-31 RX ADMIN — HYDROMORPHONE HYDROCHLORIDE 1 MG: 1 INJECTION, SOLUTION INTRAMUSCULAR; INTRAVENOUS; SUBCUTANEOUS at 09:17

## 2020-10-31 RX ADMIN — ENOXAPARIN SODIUM 40 MG: 40 INJECTION SUBCUTANEOUS at 05:08

## 2020-10-31 RX ADMIN — SODIUM CHLORIDE 1000 ML: 9 INJECTION, SOLUTION INTRAVENOUS at 09:21

## 2020-10-31 RX ADMIN — HYDROMORPHONE HYDROCHLORIDE 1 MG: 1 INJECTION, SOLUTION INTRAMUSCULAR; INTRAVENOUS; SUBCUTANEOUS at 17:13

## 2020-10-31 RX ADMIN — HYDROMORPHONE HYDROCHLORIDE 1 MG: 1 INJECTION, SOLUTION INTRAMUSCULAR; INTRAVENOUS; SUBCUTANEOUS at 01:52

## 2020-10-31 NOTE — PROGRESS NOTES
Hospitalist Progress Note    Subjective:   Daily Progress Note: 10/31/2020 2:37 PM    Hospital Course:   Admitted 10/29/2020 patient is 25-year-old male with a PMH significant for prediabetes, and HLD. He comes into the emergency room with diffuse abdominal pain nausea and vomiting. Denies abusive alcohol consumption. Associated symptoms of bloated gassy feeling without bowel movement. Significant labs on admission WBC count 13 lipase 3000 KUB revealing gas filled loops appear nondilated concerning for acute abdominal process. CT of abdomen findings are consistent with pancreatitis, negative for CBD or gallstones. Admitted for further management and evaluation of acute pancreatitis, most likely secondary to alcohol consumption. GI consulted. IV fluids initiated, IV antibiotics and clear liquid diet. IV pain medication and IV antiemetics. Continues to have abdominal pain. Will place on n.p.o. status until pancreatitis improves. Will need ultrasound of gallbladder and endoscopic ultrasound of pancreas as outpatient once acute pancreatitis has resolved. Subjective: Follow-up examination of patient at the bedside. Continues to complain of abdominal discomfort. Will place n.p.o. Per interview with GI he admits to consuming larger amounts of alcohol in form of beer and whiskey on a regular basis. Current Facility-Administered Medications   Medication Dose Route Frequency    HYDROmorphone (DILAUDID) injection 1 mg  1 mg IntraVENous Q4H PRN    0.9% sodium chloride infusion 1,000 mL  1,000 mL IntraVENous CONTINUOUS    cefTRIAXone (ROCEPHIN) 1 g in 0.9% sodium chloride (MBP/ADV) 50 mL MBP  1 g IntraVENous Q24H    enoxaparin (LOVENOX) injection 40 mg  40 mg SubCUTAneous Q24H        REVIEW OF SYSTEMS    Review of Systems   Constitutional: Positive for malaise/fatigue. Eyes: Negative. Respiratory: Negative. Cardiovascular: Negative.     Gastrointestinal: Positive for abdominal pain, nausea and vomiting. Neurological: Positive for weakness. Objective:     Visit Vitals  /81   Pulse 84   Temp 99.3 °F (37.4 °C)   Resp 16   Ht 6' 1\" (1.854 m)   Wt 116.1 kg (256 lb)   SpO2 96%   BMI 33.78 kg/m²      O2 Device: Room air    Temp (24hrs), Av.7 °F (37.6 °C), Min:98.9 °F (37.2 °C), Max:100.9 °F (38.3 °C)      No intake/output data recorded. 10/29 190 - 10/31 0700  In: 1000 [I.V.:1000]  Out: -     PHYSICAL EXAM:    Physical Exam  Constitutional:       Appearance: He is obese. Eyes:      Pupils: Pupils are equal, round, and reactive to light. Neck:      Musculoskeletal: Normal range of motion. Cardiovascular:      Rate and Rhythm: Normal rate and regular rhythm. Pulmonary:      Effort: Pulmonary effort is normal.   Abdominal:      General: There is distension. Tenderness: There is abdominal tenderness. There is guarding. Skin:     General: Skin is dry. Neurological:      General: No focal deficit present. Mental Status: He is alert. Data Review    Recent Results (from the past 24 hour(s))   METABOLIC PANEL, COMPREHENSIVE    Collection Time: 10/30/20  4:16 PM   Result Value Ref Range    Sodium 139 136 - 145 mmol/L    Potassium 3.7 3.5 - 5.1 mmol/L    Chloride 105 97 - 108 mmol/L    CO2 28 21 - 32 mmol/L    Anion gap 6 5 - 15 mmol/L    Glucose 152 (H) 65 - 100 mg/dL    BUN 10 6 - 20 mg/dL    Creatinine 0.97 0.70 - 1.30 mg/dL    BUN/Creatinine ratio 10 (L) 12 - 20      GFR est AA >60 >60 ml/min/1.73m2    GFR est non-AA >60 >60 ml/min/1.73m2    Calcium 8.4 (L) 8.5 - 10.1 mg/dL    Bilirubin, total 0.7 0.2 - 1.0 mg/dL    AST (SGOT) 16 15 - 37 U/L    ALT (SGPT) 26 12 - 78 U/L    Alk.  phosphatase 62 45 - 117 U/L    Protein, total 7.0 6.4 - 8.2 g/dL    Albumin 3.6 3.5 - 5.0 g/dL    Globulin 3.4 2.0 - 4.0 g/dL    A-G Ratio 1.1 1.1 - 2.2     LIPASE    Collection Time: 10/30/20  4:16 PM   Result Value Ref Range    Lipase 875 (H) 73 - 393 U/L   MAGNESIUM    Collection Time: 10/30/20  4:16 PM   Result Value Ref Range    Magnesium 1.6 1.6 - 2.4 mg/dL   PHOSPHORUS    Collection Time: 10/30/20  4:16 PM   Result Value Ref Range    Phosphorus 2.4 (L) 2.6 - 4.7 mg/dL   CBC W/O DIFF    Collection Time: 10/30/20  4:16 PM   Result Value Ref Range    WBC 11.6 (H) 4.1 - 11.1 K/uL    RBC 3.80 (L) 4.10 - 5.70 M/uL    HGB 12.3 12.1 - 17.0 g/dL    HCT 36.7 36.6 - 50.3 %    MCV 96.6 80.0 - 99.0 FL    MCH 32.4 26.0 - 34.0 PG    MCHC 33.5 30.0 - 36.5 g/dL    RDW 14.8 (H) 11.5 - 14.5 %    PLATELET 420 156 - 927 K/uL    MPV 11.0 8.9 - 12.9 FL       CT ABD PELV W CONT   Final Result   IMPRESSION:      1. Findings most likely related to acute pancreatitis without findings of   complication at this time. 2. Other chronic findings, as detailed above. XR ABD (KUB)   Final Result   IMPRESSION:   1. Relative paucity of bowel gas with gas filled loops appearing nondilated. If   there is persistent clinical concern for an acute abdominal process, further   evaluation with CT should be considered. Active Problems:    Acute pancreatitis (10/29/2020)        Assessment/Plan:     1. Acute abdominal pain:  2. Nausea and vomiting:  3. Acute pancreatitis: Unknown etiology  · Lipase 3000 on admission> 875>labs pending for today  · CT confirming pancreatitis  · GI consulted  · IV ceftriaxone, IV fluids, IV antiemetics and IV pain medications  · N.p.o.  ·   4. DM:  · We will hold home medication metformin for now  · Accu-Cheks with SSI    5.  HLD:   · We will hold for now    6.   History of diverticulosis:   · No signs and symptoms of GI bleed at this time        DVT Prophylaxis: Lovenox  Code Status: Full Code  POA/NOK:  ______________________________________________________________________________  Time spent in direct care including coordination of service, review of data and examination: > 35 minutes  Care Plan discussed with: ______________________________________________________________________________    Thersia Proper, NP    This is dictation was done by [employer], computer voice recognition software. Quite often unanticipated grammatical, syntax, homophones and other interpretive errors or inadvertently transcribed by the computer software. Please excuse errors that have escaped final proofreading. Thank you.

## 2020-10-31 NOTE — PROGRESS NOTES
Problem: Falls - Risk of  Goal: *Absence of Falls  Description: Document Yuli Eason Fall Risk and appropriate interventions in the flowsheet.   Outcome: Progressing Towards Goal  Note: Fall Risk Interventions:                                Problem: Patient Education: Go to Patient Education Activity  Goal: Patient/Family Education  Outcome: Progressing Towards Goal

## 2020-11-01 LAB
ANION GAP SERPL CALC-SCNC: 4 MMOL/L (ref 5–15)
BASOPHILS # BLD: 0 K/UL (ref 0–0.1)
BASOPHILS NFR BLD: 0 % (ref 0–1)
BUN SERPL-MCNC: 5 MG/DL (ref 6–20)
BUN/CREAT SERPL: 7 (ref 12–20)
CA-I BLD-MCNC: 8.5 MG/DL (ref 8.5–10.1)
CHLORIDE SERPL-SCNC: 107 MMOL/L (ref 97–108)
CO2 SERPL-SCNC: 28 MMOL/L (ref 21–32)
CREAT SERPL-MCNC: 0.76 MG/DL (ref 0.7–1.3)
DIFFERENTIAL METHOD BLD: ABNORMAL
EOSINOPHIL # BLD: 0.2 K/UL (ref 0–0.4)
EOSINOPHIL NFR BLD: 2 % (ref 0–7)
ERYTHROCYTE [DISTWIDTH] IN BLOOD BY AUTOMATED COUNT: 14.4 % (ref 11.5–14.5)
GLUCOSE SERPL-MCNC: 133 MG/DL (ref 65–100)
HCT VFR BLD AUTO: 33.8 % (ref 36.6–50.3)
HGB BLD-MCNC: 11.5 G/DL (ref 12.1–17)
IMM GRANULOCYTES # BLD AUTO: 0 K/UL (ref 0–0.04)
IMM GRANULOCYTES NFR BLD AUTO: 0 % (ref 0–0.5)
LIPASE SERPL-CCNC: 619 U/L (ref 73–393)
LYMPHOCYTES # BLD: 1.3 K/UL (ref 0.8–3.5)
LYMPHOCYTES NFR BLD: 13 % (ref 12–49)
MCH RBC QN AUTO: 32.4 PG (ref 26–34)
MCHC RBC AUTO-ENTMCNC: 34 G/DL (ref 30–36.5)
MCV RBC AUTO: 95.2 FL (ref 80–99)
MONOCYTES # BLD: 0.8 K/UL (ref 0–1)
MONOCYTES NFR BLD: 8 % (ref 5–13)
NEUTS SEG # BLD: 8.1 K/UL (ref 1.8–8)
NEUTS SEG NFR BLD: 77 % (ref 32–75)
PLATELET # BLD AUTO: 171 K/UL (ref 150–400)
PMV BLD AUTO: 10.8 FL (ref 8.9–12.9)
POTASSIUM SERPL-SCNC: 3.5 MMOL/L (ref 3.5–5.1)
RBC # BLD AUTO: 3.55 M/UL (ref 4.1–5.7)
SODIUM SERPL-SCNC: 139 MMOL/L (ref 136–145)
WBC # BLD AUTO: 10.4 K/UL (ref 4.1–11.1)

## 2020-11-01 PROCEDURE — 74011250637 HC RX REV CODE- 250/637: Performed by: NURSE PRACTITIONER

## 2020-11-01 PROCEDURE — 74011000258 HC RX REV CODE- 258: Performed by: HOSPITALIST

## 2020-11-01 PROCEDURE — 65270000029 HC RM PRIVATE

## 2020-11-01 PROCEDURE — 74011250636 HC RX REV CODE- 250/636: Performed by: NURSE PRACTITIONER

## 2020-11-01 PROCEDURE — 80048 BASIC METABOLIC PNL TOTAL CA: CPT

## 2020-11-01 PROCEDURE — 83690 ASSAY OF LIPASE: CPT

## 2020-11-01 PROCEDURE — 36415 COLL VENOUS BLD VENIPUNCTURE: CPT

## 2020-11-01 PROCEDURE — 85025 COMPLETE CBC W/AUTO DIFF WBC: CPT

## 2020-11-01 PROCEDURE — 74011250636 HC RX REV CODE- 250/636: Performed by: HOSPITALIST

## 2020-11-01 RX ORDER — HYDRALAZINE HYDROCHLORIDE 25 MG/1
25 TABLET, FILM COATED ORAL 3 TIMES DAILY
Status: DISCONTINUED | OUTPATIENT
Start: 2020-11-01 | End: 2020-11-04 | Stop reason: HOSPADM

## 2020-11-01 RX ORDER — HYDROMORPHONE HYDROCHLORIDE 1 MG/ML
1 INJECTION, SOLUTION INTRAMUSCULAR; INTRAVENOUS; SUBCUTANEOUS
Status: DISPENSED | OUTPATIENT
Start: 2020-11-01 | End: 2020-11-03

## 2020-11-01 RX ORDER — LANOLIN ALCOHOL/MO/W.PET/CERES
1000 CREAM (GRAM) TOPICAL DAILY
Status: DISCONTINUED | OUTPATIENT
Start: 2020-11-02 | End: 2020-11-04 | Stop reason: HOSPADM

## 2020-11-01 RX ORDER — ASPIRIN 325 MG/1
100 TABLET, FILM COATED ORAL DAILY
Status: DISCONTINUED | OUTPATIENT
Start: 2020-11-02 | End: 2020-11-04 | Stop reason: HOSPADM

## 2020-11-01 RX ORDER — LISINOPRIL 10 MG/1
10 TABLET ORAL DAILY
Status: DISCONTINUED | OUTPATIENT
Start: 2020-11-02 | End: 2020-11-04 | Stop reason: HOSPADM

## 2020-11-01 RX ADMIN — HYDRALAZINE HYDROCHLORIDE 25 MG: 25 TABLET, FILM COATED ORAL at 18:31

## 2020-11-01 RX ADMIN — SODIUM CHLORIDE 1000 ML: 9 INJECTION, SOLUTION INTRAVENOUS at 04:44

## 2020-11-01 RX ADMIN — CHLORDIAZEPOXIDE HYDROCHLORIDE 25 MG: 10 CAPSULE ORAL at 20:57

## 2020-11-01 RX ADMIN — HYDROMORPHONE HYDROCHLORIDE 1 MG: 1 INJECTION, SOLUTION INTRAMUSCULAR; INTRAVENOUS; SUBCUTANEOUS at 04:43

## 2020-11-01 RX ADMIN — HYDROMORPHONE HYDROCHLORIDE 1 MG: 1 INJECTION, SOLUTION INTRAMUSCULAR; INTRAVENOUS; SUBCUTANEOUS at 20:00

## 2020-11-01 RX ADMIN — HYDROMORPHONE HYDROCHLORIDE 1 MG: 1 INJECTION, SOLUTION INTRAMUSCULAR; INTRAVENOUS; SUBCUTANEOUS at 15:14

## 2020-11-01 RX ADMIN — HYDROMORPHONE HYDROCHLORIDE 1 MG: 1 INJECTION, SOLUTION INTRAMUSCULAR; INTRAVENOUS; SUBCUTANEOUS at 00:55

## 2020-11-01 RX ADMIN — ENOXAPARIN SODIUM 40 MG: 40 INJECTION SUBCUTANEOUS at 04:40

## 2020-11-01 RX ADMIN — ACETAMINOPHEN ORAL SOLUTION 650 MG: 650 SOLUTION ORAL at 18:31

## 2020-11-01 RX ADMIN — LACTULOSE 30 ML: 20 SOLUTION ORAL at 09:02

## 2020-11-01 RX ADMIN — HYDROMORPHONE HYDROCHLORIDE 1 MG: 1 INJECTION, SOLUTION INTRAMUSCULAR; INTRAVENOUS; SUBCUTANEOUS at 09:02

## 2020-11-01 RX ADMIN — CEFTRIAXONE SODIUM 1 G: 1 INJECTION, POWDER, FOR SOLUTION INTRAMUSCULAR; INTRAVENOUS at 20:53

## 2020-11-01 NOTE — PROGRESS NOTES
Progress Note    Patient: Aretha Stone MRN: [id number]  SSN: [id number]    YOB: 1960  Age: 56 y.o. Sex: male      Admit Date: 10/29/2020    LOS: 3 days     Subjective:     Patient is comfortable abdominal pain has improved  Still has some discomfort denies any nausea or vomiting he is passing flatus but has not had a bowel movement yet. He is afebrile    Objective:     Vitals:    10/31/20 1930 10/31/20 2016 11/01/20 0033 11/01/20 0840   BP:  (!) 142/80 (!) 140/85 (!) 154/99   Pulse:  82 85 74   Resp:  20 20 18   Temp: 97.4 °F (36.3 °C) 100.3 °F (37.9 °C) 99.4 °F (37.4 °C) 99.1 °F (37.3 °C)   SpO2:  96% 96% 99%   Weight:       Height:            Intake and Output:  Current Shift: No intake/output data recorded. Last three shifts: 10/30 1901 - 11/01 0700  In: 900 [I.V.:900]  Out: 500 [Urine:500]    Physical Exam:   Skin:  Extremities and face reveal no rashes. No martin erythema. HEENT: Sclerae anicteric. Extra-occular muscles are intact. No abnormal pigmentation of the lips. The neck is supple. Cardiovascular: Regular rate and rhythm. Respiratory:  Comfortable breathing with no accessory muscle use. GI:  Abdomen nondistended, soft, and nontender. No enlargement of the liver or spleen. No masses palpable. Rectal:  Deferred  Musculoskeletal: Extremities have good range of motion. Neurological:  Gross memory appears intact. Patient is alert and oriented. Psychiatric:  Mood appears appropriate with judgement intact. Lymphatic:  No visible adenopathy      Lab/Data Review:  No results found for this or any previous visit (from the past 24 hour(s)). CT ABD PELV W CONT   Final Result   IMPRESSION:      1. Findings most likely related to acute pancreatitis without findings of   complication at this time. 2. Other chronic findings, as detailed above. XR ABD (KUB)   Final Result   IMPRESSION:   1. Relative paucity of bowel gas with gas filled loops appearing nondilated. If   there is persistent clinical concern for an acute abdominal process, further   evaluation with CT should be considered. Assessment:     Active Problems:    Acute pancreatitis (10/29/2020)        Plan:   Secondary to alcohol it is resolving consider starting low-fat diet and follow-up as an outpatient in 2 weeks time for endoscopic ultrasound.   Thank you for allowing me to participate in this patients care    Signed By: Kayla Lemus MD     November 1, 2020

## 2020-11-01 NOTE — PROGRESS NOTES
Hospitalist Progress Note    Subjective:   Daily Progress Note: 11/1/2020 2:37 PM    Hospital Course:   Admitted 10/29/2020 patient is 75-year-old male with a PMH significant for prediabetes, and HLD. He comes into the emergency room with diffuse abdominal pain nausea and vomiting. Denies abusive alcohol consumption. Associated symptoms of bloated gassy feeling without bowel movement. Significant labs on admission WBC count 13 lipase 3000 KUB revealing gas filled loops appear nondilated concerning for acute abdominal process. CT of abdomen findings are consistent with pancreatitis, negative for CBD or gallstones. Admitted for further management and evaluation of acute pancreatitis, most likely secondary to alcohol consumption. GI consulted. IV fluids initiated, IV antibiotics and clear liquid diet. IV pain medication and IV antiemetics. Continues to have abdominal pain. Will place on n.p.o. status until pancreatitis improves. Will need ultrasound of gallbladder and endoscopic ultrasound of pancreas as outpatient once acute pancreatitis has resolved. Subjective: Follow-up examination of patient at the bedside. Complaining of constipation and gassy feeling. States abdominal pain comes and goes. Current Facility-Administered Medications   Medication Dose Route Frequency    HYDROmorphone (DILAUDID) injection 1 mg  1 mg IntraVENous Q4H PRN    0.9% sodium chloride infusion 1,000 mL  1,000 mL IntraVENous CONTINUOUS    cefTRIAXone (ROCEPHIN) 1 g in 0.9% sodium chloride (MBP/ADV) 50 mL MBP  1 g IntraVENous Q24H    enoxaparin (LOVENOX) injection 40 mg  40 mg SubCUTAneous Q24H        REVIEW OF SYSTEMS    Review of Systems   Constitutional: Positive for malaise/fatigue. Eyes: Negative. Respiratory: Negative. Cardiovascular: Negative. Gastrointestinal: Positive for abdominal pain, nausea and vomiting. Neurological: Positive for weakness.         Objective:     Visit Vitals  BP (!) 140/85 Pulse 85   Temp 99.4 °F (37.4 °C)   Resp 20   Ht 6' 1\" (1.854 m)   Wt 116.1 kg (256 lb)   SpO2 96%   BMI 33.78 kg/m²      O2 Device: Room air    Temp (24hrs), Av.2 °F (37.3 °C), Min:97.4 °F (36.3 °C), Max:100.3 °F (37.9 °C)      No intake/output data recorded. 10/30 190 -  0700  In: 900 [I.V.:900]  Out: 500 [Urine:500]    PHYSICAL EXAM:    Physical Exam  Constitutional:       Appearance: He is obese. Eyes:      Pupils: Pupils are equal, round, and reactive to light. Neck:      Musculoskeletal: Normal range of motion. Cardiovascular:      Rate and Rhythm: Normal rate and regular rhythm. Pulmonary:      Effort: Pulmonary effort is normal.   Abdominal:      General: There is distension. Tenderness: There is abdominal tenderness. There is guarding. Skin:     General: Skin is dry. Neurological:      General: No focal deficit present. Mental Status: He is alert. Data Review    No results found for this or any previous visit (from the past 24 hour(s)). CT ABD PELV W CONT   Final Result   IMPRESSION:      1. Findings most likely related to acute pancreatitis without findings of   complication at this time. 2. Other chronic findings, as detailed above. XR ABD (KUB)   Final Result   IMPRESSION:   1. Relative paucity of bowel gas with gas filled loops appearing nondilated. If   there is persistent clinical concern for an acute abdominal process, further   evaluation with CT should be considered. Active Problems:    Acute pancreatitis (10/29/2020)        Assessment/Plan:     1. Acute abdominal pain:  2. Nausea and vomiting:  3. Acute pancreatitis: Unknown etiology  · Lipase 3000 on admission> 875>labs pending for today  · CT confirming pancreatitis  · GI consulted  · IV ceftriaxone, IV fluids, IV antiemetics and IV pain medications  · N.p.o.  ·   4.   DM:  · We will hold home medication metformin for now  · Accu-Cheks with SSI    5.  HLD:   · We will hold for now    6.  History of diverticulosis:   · No signs and symptoms of GI bleed at this time        DVT Prophylaxis: Lovenox  Code Status: Full Code  POA/RONITK:  ______________________________________________________________________________  Time spent in direct care including coordination of service, review of data and examination: > 35 minutes  Care Plan discussed with:   ______________________________________________________________________________    Je Blunt NP    This is dictation was done by dragon, computer voice recognition software. Quite often unanticipated grammatical, syntax, homophones and other interpretive errors or inadvertently transcribed by the computer software. Please excuse errors that have escaped final proofreading. Thank you.

## 2020-11-02 LAB
ANION GAP SERPL CALC-SCNC: 6 MMOL/L (ref 5–15)
BASOPHILS # BLD: 0 K/UL (ref 0–0.1)
BASOPHILS NFR BLD: 0 % (ref 0–1)
BUN SERPL-MCNC: 6 MG/DL (ref 6–20)
BUN/CREAT SERPL: 8 (ref 12–20)
CA-I BLD-MCNC: 8.9 MG/DL (ref 8.5–10.1)
CHLORIDE SERPL-SCNC: 107 MMOL/L (ref 97–108)
CO2 SERPL-SCNC: 27 MMOL/L (ref 21–32)
CREAT SERPL-MCNC: 0.74 MG/DL (ref 0.7–1.3)
DIFFERENTIAL METHOD BLD: ABNORMAL
EOSINOPHIL # BLD: 0.2 K/UL (ref 0–0.4)
EOSINOPHIL NFR BLD: 2 % (ref 0–7)
ERYTHROCYTE [DISTWIDTH] IN BLOOD BY AUTOMATED COUNT: 14.1 % (ref 11.5–14.5)
GLUCOSE SERPL-MCNC: 115 MG/DL (ref 65–100)
HCT VFR BLD AUTO: 33.3 % (ref 36.6–50.3)
HGB BLD-MCNC: 11.4 G/DL (ref 12.1–17)
IMM GRANULOCYTES # BLD AUTO: 0 K/UL (ref 0–0.04)
IMM GRANULOCYTES NFR BLD AUTO: 0 % (ref 0–0.5)
LIPASE SERPL-CCNC: 245 U/L (ref 73–393)
LYMPHOCYTES # BLD: 1.2 K/UL (ref 0.8–3.5)
LYMPHOCYTES NFR BLD: 13 % (ref 12–49)
MCH RBC QN AUTO: 32.5 PG (ref 26–34)
MCHC RBC AUTO-ENTMCNC: 34.2 G/DL (ref 30–36.5)
MCV RBC AUTO: 94.9 FL (ref 80–99)
MONOCYTES # BLD: 0.9 K/UL (ref 0–1)
MONOCYTES NFR BLD: 9 % (ref 5–13)
NEUTS SEG # BLD: 7.2 K/UL (ref 1.8–8)
NEUTS SEG NFR BLD: 76 % (ref 32–75)
PLATELET # BLD AUTO: 187 K/UL (ref 150–400)
PMV BLD AUTO: 10.7 FL (ref 8.9–12.9)
POTASSIUM SERPL-SCNC: 3.7 MMOL/L (ref 3.5–5.1)
RBC # BLD AUTO: 3.51 M/UL (ref 4.1–5.7)
SODIUM SERPL-SCNC: 140 MMOL/L (ref 136–145)
WBC # BLD AUTO: 9.4 K/UL (ref 4.1–11.1)

## 2020-11-02 PROCEDURE — 74011250636 HC RX REV CODE- 250/636: Performed by: NURSE PRACTITIONER

## 2020-11-02 PROCEDURE — 85025 COMPLETE CBC W/AUTO DIFF WBC: CPT

## 2020-11-02 PROCEDURE — 65270000029 HC RM PRIVATE

## 2020-11-02 PROCEDURE — 74011250637 HC RX REV CODE- 250/637: Performed by: NURSE PRACTITIONER

## 2020-11-02 PROCEDURE — 83690 ASSAY OF LIPASE: CPT

## 2020-11-02 PROCEDURE — 36415 COLL VENOUS BLD VENIPUNCTURE: CPT

## 2020-11-02 PROCEDURE — 74011250636 HC RX REV CODE- 250/636: Performed by: HOSPITALIST

## 2020-11-02 PROCEDURE — 80048 BASIC METABOLIC PNL TOTAL CA: CPT

## 2020-11-02 PROCEDURE — 74011250637 HC RX REV CODE- 250/637: Performed by: HOSPITALIST

## 2020-11-02 RX ADMIN — CYANOCOBALAMIN TAB 500 MCG 1000 MCG: 500 TAB at 08:43

## 2020-11-02 RX ADMIN — LACTULOSE 30 ML: 20 SOLUTION ORAL at 08:43

## 2020-11-02 RX ADMIN — CHLORDIAZEPOXIDE HYDROCHLORIDE 25 MG: 10 CAPSULE ORAL at 08:43

## 2020-11-02 RX ADMIN — THIAMINE HCL TAB 100 MG 100 MG: 100 TAB at 08:43

## 2020-11-02 RX ADMIN — HYDROMORPHONE HYDROCHLORIDE 1 MG: 1 INJECTION, SOLUTION INTRAMUSCULAR; INTRAVENOUS; SUBCUTANEOUS at 12:48

## 2020-11-02 RX ADMIN — HYDRALAZINE HYDROCHLORIDE 25 MG: 25 TABLET, FILM COATED ORAL at 08:43

## 2020-11-02 RX ADMIN — ENOXAPARIN SODIUM 40 MG: 40 INJECTION SUBCUTANEOUS at 04:53

## 2020-11-02 RX ADMIN — HYDROMORPHONE HYDROCHLORIDE 1 MG: 1 INJECTION, SOLUTION INTRAMUSCULAR; INTRAVENOUS; SUBCUTANEOUS at 20:26

## 2020-11-02 RX ADMIN — HYDRALAZINE HYDROCHLORIDE 25 MG: 25 TABLET, FILM COATED ORAL at 15:46

## 2020-11-02 RX ADMIN — SODIUM CHLORIDE 1000 ML: 9 INJECTION, SOLUTION INTRAVENOUS at 21:38

## 2020-11-02 RX ADMIN — ACETAMINOPHEN ORAL SOLUTION 650 MG: 650 SOLUTION ORAL at 20:44

## 2020-11-02 RX ADMIN — LISINOPRIL 10 MG: 10 TABLET ORAL at 08:43

## 2020-11-02 RX ADMIN — SODIUM CHLORIDE 1000 ML: 9 INJECTION, SOLUTION INTRAVENOUS at 12:43

## 2020-11-02 RX ADMIN — CHLORDIAZEPOXIDE HYDROCHLORIDE 25 MG: 10 CAPSULE ORAL at 15:46

## 2020-11-02 RX ADMIN — HYDRALAZINE HYDROCHLORIDE 25 MG: 25 TABLET, FILM COATED ORAL at 21:36

## 2020-11-02 NOTE — PROGRESS NOTES
Hospitalist Progress Note    Subjective:   Daily Progress Note: 11/2/2020 2:37 PM    Hospital Course:   Admitted 10/29/2020 patient is 73-year-old male with a PMH significant for prediabetes, and HLD. He comes into the emergency room with diffuse abdominal pain nausea and vomiting. Denies abusive alcohol consumption. Associated symptoms of bloated gassy feeling without bowel movement. Significant labs on admission WBC count 13 lipase 3000 KUB revealing gas filled loops appear nondilated concerning for acute abdominal process. CT of abdomen findings are consistent with pancreatitis, negative for CBD or gallstones. Admitted for further management and evaluation of acute pancreatitis, most likely secondary to alcohol consumption. GI consulted. IV fluids initiated, IV antibiotics and clear liquid diet. IV pain medication and IV antiemetics. Continues to have abdominal pain. Will place on n.p.o. status until pancreatitis improves. Will need ultrasound of gallbladder and endoscopic ultrasound of pancreas as outpatient once acute pancreatitis has resolved. Subjective: Follow-up examination of patient at the bedside. He states his abdominal pain is resolved today. He also states he had a bowel movement last night. We will attempt clear liquids today advance as tolerated. Discussed plan of care with him and his wife at the bedside. Discussed abstinence from alcohol.     Current Facility-Administered Medications   Medication Dose Route Frequency    cyanocobalamin (VITAMIN B12) tablet 1,000 mcg  1,000 mcg Oral DAILY    lactulose (CHRONULAC) 10 gram/15 mL solution 30 mL  20 g Oral DAILY    HYDROmorphone (DILAUDID) injection 1 mg  1 mg IntraVENous Q4H PRN    lisinopriL (PRINIVIL, ZESTRIL) tablet 10 mg  10 mg Oral DAILY    hydrALAZINE (APRESOLINE) tablet 25 mg  25 mg Oral TID    thiamine mononitrate (B-1) tablet 100 mg  100 mg Oral DAILY    chlordiazePOXIDE (LIBRIUM) capsule 25 mg  25 mg Oral TID   [facility] acetaminophen (TYLENOL) solution 650 mg  650 mg Oral Q4H PRN    0.9% sodium chloride infusion 1,000 mL  1,000 mL IntraVENous CONTINUOUS    cefTRIAXone (ROCEPHIN) 1 g in 0.9% sodium chloride (MBP/ADV) 50 mL MBP  1 g IntraVENous Q24H    enoxaparin (LOVENOX) injection 40 mg  40 mg SubCUTAneous Q24H        REVIEW OF SYSTEMS    Review of Systems   Constitutional: Positive for malaise/fatigue. Eyes: Negative. Respiratory: Negative. Cardiovascular: Negative. Gastrointestinal: Positive for abdominal pain, nausea and vomiting. Neurological: Positive for weakness. Objective:     Visit Vitals  /62 (BP 1 Location: Right arm, BP Patient Position: At rest)   Pulse 85   Temp 98.2 °F (36.8 °C)   Resp 18   Ht 6' 1\" (1.854 m)   Wt 116.1 kg (256 lb)   SpO2 95%   BMI 33.78 kg/m²      O2 Device: Room air    Temp (24hrs), Av.3 °F (37.4 °C), Min:98.2 °F (36.8 °C), Max:100.7 °F (38.2 °C)      No intake/output data recorded. 10/31 1901 -  0700  In: 9752 [I.V.:1777]  Out: 100 [Urine:100]    PHYSICAL EXAM:    Physical Exam  Constitutional:       Appearance: He is obese. Eyes:      Pupils: Pupils are equal, round, and reactive to light. Neck:      Musculoskeletal: Normal range of motion. Cardiovascular:      Rate and Rhythm: Normal rate and regular rhythm. Pulmonary:      Effort: Pulmonary effort is normal.   Abdominal:      General: There is distension. Tenderness: There is abdominal tenderness. There is guarding. Skin:     General: Skin is dry. Neurological:      General: No focal deficit present. Mental Status: He is alert.           Data Review    Recent Results (from the past 24 hour(s))   CBC WITH AUTOMATED DIFF    Collection Time: 20  8:52 AM   Result Value Ref Range    WBC 9.4 4.1 - 11.1 K/uL    RBC 3.51 (L) 4.10 - 5.70 M/uL    HGB 11.4 (L) 12.1 - 17.0 g/dL    HCT 33.3 (L) 36.6 - 50.3 %    MCV 94.9 80.0 - 99.0 FL    MCH 32.5 26.0 - 34.0 PG    MCHC 34.2 30.0 - 36.5 g/dL RDW 14.1 11.5 - 14.5 %    PLATELET 796 218 - 511 K/uL    MPV 10.7 8.9 - 12.9 FL    NEUTROPHILS 76 (H) 32 - 75 %    LYMPHOCYTES 13 12 - 49 %    MONOCYTES 9 5 - 13 %    EOSINOPHILS 2 0 - 7 %    BASOPHILS 0 0 - 1 %    IMMATURE GRANULOCYTES 0 0.0 - 0.5 %    ABS. NEUTROPHILS 7.2 1.8 - 8.0 K/UL    ABS. LYMPHOCYTES 1.2 0.8 - 3.5 K/UL    ABS. MONOCYTES 0.9 0.0 - 1.0 K/UL    ABS. EOSINOPHILS 0.2 0.0 - 0.4 K/UL    ABS. BASOPHILS 0.0 0.0 - 0.1 K/UL    ABS. IMM. GRANS. 0.0 0.00 - 0.04 K/UL    DF AUTOMATED     METABOLIC PANEL, BASIC    Collection Time: 11/02/20  8:52 AM   Result Value Ref Range    Sodium 140 136 - 145 mmol/L    Potassium 3.7 3.5 - 5.1 mmol/L    Chloride 107 97 - 108 mmol/L    CO2 27 21 - 32 mmol/L    Anion gap 6 5 - 15 mmol/L    Glucose 115 (H) 65 - 100 mg/dL    BUN 6 6 - 20 mg/dL    Creatinine 0.74 0.70 - 1.30 mg/dL    BUN/Creatinine ratio 8 (L) 12 - 20      GFR est AA >60 >60 ml/min/1.73m2    GFR est non-AA >60 >60 ml/min/1.73m2    Calcium 8.9 8.5 - 10.1 mg/dL   LIPASE    Collection Time: 11/02/20  8:52 AM   Result Value Ref Range    Lipase 245 73 - 393 U/L       CT ABD PELV W CONT   Final Result   IMPRESSION:      1. Findings most likely related to acute pancreatitis without findings of   complication at this time. 2. Other chronic findings, as detailed above. XR ABD (KUB)   Final Result   IMPRESSION:   1. Relative paucity of bowel gas with gas filled loops appearing nondilated. If   there is persistent clinical concern for an acute abdominal process, further   evaluation with CT should be considered. Active Problems:    Acute pancreatitis (10/29/2020)        Assessment/Plan:     1. Acute abdominal pain:  2. Nausea and vomiting:  3.   Acute pancreatitis: Unknown etiology  · Lipase 3000 on admission> 875> 669>619>245>labs pending for today  · CT confirming pancreatitis  · GI consulted  · IV ceftriaxone, IV fluids, IV antiemetics and IV pain medications  · We will add clear liquid diet    4. DM:  · We will hold home medication metformin for now  · Accu-Cheks with SSI    5.  HLD:   · We will hold for now    6. History of diverticulosis:   · No signs and symptoms of GI bleed at this time    7. Alcohol abuse:  · Discussed abstinence from alcohol  · Started on Librium, folic acid, thiamine      DVT Prophylaxis: Lovenox  Code Status: Full Code  POA/NOK: Wife  ______________________________________________________________________________  Time spent in direct care including coordination of service, review of data and examination: > 35 minutes  Care Plan discussed with: Patient, wife and nurse. Probable discharge in the next 24 to 48 hours. Started on clear liquid diet advance as tolerated, once tolerating solids can discharge with outpatient follow-up with GI.  ______________________________________________________________________________    Franky Ann NP    This is dictation was done by dragon, computer voice recognition software. Quite often unanticipated grammatical, syntax, homophones and other interpretive errors or inadvertently transcribed by the computer software. Please excuse errors that have escaped final proofreading. Thank you.

## 2020-11-02 NOTE — PROGRESS NOTES
Problem: Falls - Risk of  Goal: *Absence of Falls  Description: Document Trina Hatfield Fall Risk and appropriate interventions in the flowsheet.   Outcome: Progressing Towards Goal  Note: Fall Risk Interventions:            Medication Interventions: Patient to call before getting OOB, Bed/chair exit alarm         History of Falls Interventions: Bed/chair exit alarm         Problem: Patient Education: Go to Patient Education Activity  Goal: Patient/Family Education  Outcome: Progressing Towards Goal

## 2020-11-03 LAB
ANION GAP SERPL CALC-SCNC: 7 MMOL/L (ref 5–15)
BASOPHILS # BLD: 0 K/UL (ref 0–0.1)
BASOPHILS NFR BLD: 0 % (ref 0–1)
BUN SERPL-MCNC: 6 MG/DL (ref 6–20)
BUN/CREAT SERPL: 8 (ref 12–20)
CA-I BLD-MCNC: 9 MG/DL (ref 8.5–10.1)
CHLORIDE SERPL-SCNC: 109 MMOL/L (ref 97–108)
CO2 SERPL-SCNC: 26 MMOL/L (ref 21–32)
CREAT SERPL-MCNC: 0.76 MG/DL (ref 0.7–1.3)
DIFFERENTIAL METHOD BLD: ABNORMAL
EOSINOPHIL # BLD: 0.2 K/UL (ref 0–0.4)
EOSINOPHIL NFR BLD: 2 % (ref 0–7)
ERYTHROCYTE [DISTWIDTH] IN BLOOD BY AUTOMATED COUNT: 14.2 % (ref 11.5–14.5)
GLUCOSE SERPL-MCNC: 137 MG/DL (ref 65–100)
HCT VFR BLD AUTO: 32.7 % (ref 36.6–50.3)
HGB BLD-MCNC: 11 G/DL (ref 12.1–17)
IMM GRANULOCYTES # BLD AUTO: 0 K/UL (ref 0–0.04)
IMM GRANULOCYTES NFR BLD AUTO: 0 % (ref 0–0.5)
LIPASE SERPL-CCNC: 252 U/L (ref 73–393)
LYMPHOCYTES # BLD: 1.2 K/UL (ref 0.8–3.5)
LYMPHOCYTES NFR BLD: 13 % (ref 12–49)
MCH RBC QN AUTO: 31.7 PG (ref 26–34)
MCHC RBC AUTO-ENTMCNC: 33.6 G/DL (ref 30–36.5)
MCV RBC AUTO: 94.2 FL (ref 80–99)
MONOCYTES # BLD: 1.1 K/UL (ref 0–1)
MONOCYTES NFR BLD: 12 % (ref 5–13)
NEUTS SEG # BLD: 6.9 K/UL (ref 1.8–8)
NEUTS SEG NFR BLD: 73 % (ref 32–75)
PLATELET # BLD AUTO: 208 K/UL (ref 150–400)
PMV BLD AUTO: 9.8 FL (ref 8.9–12.9)
POTASSIUM SERPL-SCNC: 3.5 MMOL/L (ref 3.5–5.1)
RBC # BLD AUTO: 3.47 M/UL (ref 4.1–5.7)
SODIUM SERPL-SCNC: 142 MMOL/L (ref 136–145)
URATE SERPL-MCNC: 5 MG/DL (ref 3.5–7.2)
WBC # BLD AUTO: 9.4 K/UL (ref 4.1–11.1)

## 2020-11-03 PROCEDURE — 74011250636 HC RX REV CODE- 250/636: Performed by: HOSPITALIST

## 2020-11-03 PROCEDURE — 74011250637 HC RX REV CODE- 250/637: Performed by: HOSPITALIST

## 2020-11-03 PROCEDURE — 74011250636 HC RX REV CODE- 250/636: Performed by: NURSE PRACTITIONER

## 2020-11-03 PROCEDURE — 74011636637 HC RX REV CODE- 636/637: Performed by: NURSE PRACTITIONER

## 2020-11-03 PROCEDURE — 84550 ASSAY OF BLOOD/URIC ACID: CPT

## 2020-11-03 PROCEDURE — 80048 BASIC METABOLIC PNL TOTAL CA: CPT

## 2020-11-03 PROCEDURE — 65270000029 HC RM PRIVATE

## 2020-11-03 PROCEDURE — 36415 COLL VENOUS BLD VENIPUNCTURE: CPT

## 2020-11-03 PROCEDURE — 85025 COMPLETE CBC W/AUTO DIFF WBC: CPT

## 2020-11-03 PROCEDURE — 74011250637 HC RX REV CODE- 250/637: Performed by: NURSE PRACTITIONER

## 2020-11-03 PROCEDURE — 83690 ASSAY OF LIPASE: CPT

## 2020-11-03 RX ORDER — COLCHICINE 0.6 MG/1
0.6 TABLET ORAL DAILY
Status: DISCONTINUED | OUTPATIENT
Start: 2020-11-03 | End: 2020-11-04 | Stop reason: HOSPADM

## 2020-11-03 RX ORDER — PREDNISONE 20 MG/1
20 TABLET ORAL
Status: DISCONTINUED | OUTPATIENT
Start: 2020-11-03 | End: 2020-11-04 | Stop reason: HOSPADM

## 2020-11-03 RX ADMIN — ENOXAPARIN SODIUM 40 MG: 40 INJECTION SUBCUTANEOUS at 06:24

## 2020-11-03 RX ADMIN — ACETAMINOPHEN ORAL SOLUTION 650 MG: 650 SOLUTION ORAL at 16:01

## 2020-11-03 RX ADMIN — HYDROMORPHONE HYDROCHLORIDE 1 MG: 1 INJECTION, SOLUTION INTRAMUSCULAR; INTRAVENOUS; SUBCUTANEOUS at 11:51

## 2020-11-03 RX ADMIN — HYDROMORPHONE HYDROCHLORIDE 1 MG: 1 INJECTION, SOLUTION INTRAMUSCULAR; INTRAVENOUS; SUBCUTANEOUS at 07:31

## 2020-11-03 RX ADMIN — CHLORDIAZEPOXIDE HYDROCHLORIDE 25 MG: 10 CAPSULE ORAL at 16:01

## 2020-11-03 RX ADMIN — LISINOPRIL 10 MG: 10 TABLET ORAL at 08:41

## 2020-11-03 RX ADMIN — COLCHICINE 0.6 MG: 0.6 TABLET, FILM COATED ORAL at 11:51

## 2020-11-03 RX ADMIN — HYDRALAZINE HYDROCHLORIDE 25 MG: 25 TABLET, FILM COATED ORAL at 08:41

## 2020-11-03 RX ADMIN — HYDRALAZINE HYDROCHLORIDE 25 MG: 25 TABLET, FILM COATED ORAL at 22:51

## 2020-11-03 RX ADMIN — LACTULOSE 30 ML: 20 SOLUTION ORAL at 08:41

## 2020-11-03 RX ADMIN — CYANOCOBALAMIN TAB 500 MCG 1000 MCG: 500 TAB at 08:41

## 2020-11-03 RX ADMIN — THIAMINE HCL TAB 100 MG 100 MG: 100 TAB at 08:41

## 2020-11-03 RX ADMIN — PREDNISONE 20 MG: 20 TABLET ORAL at 11:51

## 2020-11-03 RX ADMIN — HYDROMORPHONE HYDROCHLORIDE 1 MG: 1 INJECTION, SOLUTION INTRAMUSCULAR; INTRAVENOUS; SUBCUTANEOUS at 03:26

## 2020-11-03 RX ADMIN — CHLORDIAZEPOXIDE HYDROCHLORIDE 25 MG: 10 CAPSULE ORAL at 08:41

## 2020-11-03 RX ADMIN — HYDRALAZINE HYDROCHLORIDE 25 MG: 25 TABLET, FILM COATED ORAL at 16:01

## 2020-11-03 NOTE — PROGRESS NOTES
Hospitalist Progress Note             Daily Progress Note: 11/3/2020      Subjective: The patient is seen for follow up.     57 y/o male with PMHx of Diabetes type 2, alcohol abuse, HLD, HTN was seen in the ED 10/29/2020 for diffuse abdominal pain, nausea, and vomiting. CT abdomen revealed acute pancreatitis. Lipase was elevated 3000. He was admitted for further management. Patient seen and examined at bedside, denies abdominal pain. Reports right knee and left ankle pain which he relates to gout flair up. No acute events overnight. Problem List:  Patient Active Problem List   Diagnosis Code    Acute pancreatitis K85.90         Medications reviewed  Current Facility-Administered Medications   Medication Dose Route Frequency    colchicine tablet 0.6 mg  0.6 mg Oral DAILY    predniSONE (DELTASONE) tablet 20 mg  20 mg Oral DAILY WITH BREAKFAST    cyanocobalamin (VITAMIN B12) tablet 1,000 mcg  1,000 mcg Oral DAILY    lisinopriL (PRINIVIL, ZESTRIL) tablet 10 mg  10 mg Oral DAILY    hydrALAZINE (APRESOLINE) tablet 25 mg  25 mg Oral TID    thiamine mononitrate (B-1) tablet 100 mg  100 mg Oral DAILY    chlordiazePOXIDE (LIBRIUM) capsule 25 mg  25 mg Oral TID    acetaminophen (TYLENOL) solution 650 mg  650 mg Oral Q4H PRN    0.9% sodium chloride infusion 1,000 mL  1,000 mL IntraVENous CONTINUOUS    enoxaparin (LOVENOX) injection 40 mg  40 mg SubCUTAneous Q24H       Review of Systems:   Review of Systems   Constitutional: Negative for chills, fever, malaise/fatigue and weight loss. Respiratory: Negative for cough, hemoptysis, shortness of breath and wheezing. Cardiovascular: Negative for chest pain, palpitations and orthopnea. Gastrointestinal: Negative for abdominal pain, constipation, diarrhea, nausea and vomiting. Genitourinary: Negative for dysuria, hematuria and urgency. Musculoskeletal: Positive for joint pain (right knee, left ankle- gout flair).  Negative for myalgias. Skin: Negative for itching and rash. Neurological: Negative for dizziness, weakness and headaches. Objective:   Physical Exam  Constitutional:       General: He is not in acute distress. Appearance: He is obese. He is not toxic-appearing. HENT:      Head: Normocephalic and atraumatic. Eyes:      Extraocular Movements: Extraocular movements intact. Conjunctiva/sclera: Conjunctivae normal.      Pupils: Pupils are equal, round, and reactive to light. Cardiovascular:      Rate and Rhythm: Normal rate and regular rhythm. Pulmonary:      Effort: Pulmonary effort is normal. No respiratory distress. Breath sounds: Normal breath sounds. Abdominal:      General: Bowel sounds are normal.      Palpations: Abdomen is soft. Tenderness: There is no abdominal tenderness. There is no guarding. Musculoskeletal:         General: Swelling (mild, right knee) and tenderness (right knee and left ankle) present. No deformity or signs of injury. Right lower leg: No edema. Left lower leg: No edema. Skin:     General: Skin is warm and dry. Findings: No erythema, lesion or rash. Neurological:      General: No focal deficit present. Mental Status: He is alert and oriented to person, place, and time.           Visit Vitals  BP (!) 159/98 (BP 1 Location: Right arm, BP Patient Position: At rest)   Pulse 95   Temp 98.3 °F (36.8 °C)   Resp 20   Ht 6' 1\" (1.854 m)   Wt 116.1 kg (256 lb)   SpO2 97%   BMI 33.78 kg/m²         Data Review:       Recent Days:  Recent Labs     11/03/20  0724 11/02/20  0852 11/01/20  1003   WBC 9.4 9.4 10.4   HGB 11.0* 11.4* 11.5*   HCT 32.7* 33.3* 33.8*    187 171     Recent Labs     11/03/20  0724 11/02/20  0852 11/01/20  1003    140 139   K 3.5 3.7 3.5   * 107 107   CO2 26 27 28   * 115* 133*   BUN 6 6 5*   CREA 0.76 0.74 0.76   CA 9.0 8.9 8.5     No results for input(s): PH, PCO2, PO2, HCO3, FIO2 in the last 72 hours.        Assessment/Plan   1. Acute alcoholic Pancreatitis  Improving  Lipase on presentation 10/29/20 >3000. Lipase 11/2/20 is 252. Start low-fat diet, continue IV fluids  Repeat Lipase in a.m.    2. Acute Gout Attack  Patient reports history of gout and current gout flair of right knee and left ankle. Start Colchicine and prednisone     3. Alcohol abuse  Reports he drinks 7 drinks a day  Counseled patient on abstaining from alcohol use  Continue librium, folic acid, thiamine    4. Diabetes type 2  Serum glucose 137 this a.m. Metformin is being held during hospital stay  Continue Accu-checks with SSI    5. HTN  BP was 159/98 this a.m. Likely elevated second to pain. Continue hydralazine and lisinopril. DVT prophylaxis - Continue lovenox   CBC, CMP, lipase labs in a.m. Comments: Pancreatitis symptoms significantly improved, will start low-fat diet. In relation to acute gout attack, will manage symptomatically with prednisone and colchicine. Anticipate discharge tomorrow a.m. if patient continues to improve.     Suzie Wu NP

## 2020-11-03 NOTE — PROGRESS NOTES
Comprehensive Nutrition Assessment    Type and Reason for Visit: NPO/clear liquid    Nutrition Recommendations/Plan:  Advance pt to Low fat diet   Allow snacks as desired  Adjust insulin to promote euglycemia  Please document % of all meal/snack consumed  RD available to provide f/u education as needed/desired      Nutrition Assessment:  Admitted for acute pancreatitis. Interviewed at bedside, noted pt started Low-fat diet yesterday by RD per GI recs, adjusted to clear liquids per NP yesterday as well. Today, pt endorsed feeling much better, good appetite, good tolerance of clear liquids, asking to upgrade to solids. RD discussed with pt need to follow low-fat diet in acute recovery period and slowly add fat back to diet as tolerated. Pt wife also in room with questions on pt pre-diabetes, RD answered all of wife's questions. RD also gave handouts for low-fat and DM diet. Noted NP today advanced diet back to low fat. Labs and meds reviewed, lipase returned to normal, pt on B1 and B12 supplement. Malnutrition Assessment:  Malnutrition Status:  Mild malnutrition    Context:  Acute illness     Findings of the 6 clinical characteristics of malnutrition:   Energy Intake:  7 - 50% or less of est energy requirements for 5 or more days(NPO/clears)  Weight Loss:  Unable to assess     Body Fat Loss:  Unable to assess,     Muscle Mass Loss:  Unable to assess,    Fluid Accumulation:  No significant fluid accumulation,      Nutrition Related Findings:  No edema. Pt appears obese. No N/V/D/C per pt, last BM 11/2. No more abd pain per pt, no issues with c/s. Wounds:   None       Current Nutrition Therapies:  DIET LOW FAT None    Anthropometric Measures:  · Height:  6' 0.99\" (185.4 cm)  · Current Body Wt:  116.1 kg (255 lb 15.3 oz)(10/29)   · Admission Body Wt:  255 lb 15.3 oz(10/26)    · Ideal Body Wt:  184 lbs:  139.1 %   · BMI Category:  Obese class 1 (BMI 30.0-34.9)     No wt hx to assess. Pt endorses stable wts pta. Nutrition Diagnosis:   · Inadequate oral intake related to altered GI function(acute pancreatitis) as evidenced by NPO or clear liquid status due to medical condition    Nutrition Interventions:   Food and/or Nutrient Delivery: Modify current diet(Adv to low fat diet)  Nutrition Education and Counseling: Education completed(DM and low fat diet 11/3)  Coordination of Nutrition Care: Continue to monitor while inpatient    Goals:  Intakes >/=75% of EENs in >5 days  Wt maintenance within >5 days       Nutrition Monitoring and Evaluation:   Behavioral-Environmental Outcomes: Knowledge or skill  Food/Nutrient Intake Outcomes: Food and nutrient intake  Physical Signs/Symptoms Outcomes: Weight, Nausea/vomiting    Discharge Planning:    No discharge needs at this time     Electronically signed by Daina Segovia RD on 11/3/2020 at 2:17 PM    Contact: Ext [phone]

## 2020-11-03 NOTE — PROGRESS NOTES
Hospitalist Progress Note             Daily Progress Note: 11/3/2020      Subjective: The patient is seen for follow  up. Patient is a     Problem List:  Patient Active Problem List   Diagnosis Code    Acute pancreatitis K85.90         Medications reviewed  Current Facility-Administered Medications   Medication Dose Route Frequency    colchicine tablet 0.6 mg  0.6 mg Oral DAILY    predniSONE (DELTASONE) tablet 20 mg  20 mg Oral DAILY WITH BREAKFAST    cyanocobalamin (VITAMIN B12) tablet 1,000 mcg  1,000 mcg Oral DAILY    HYDROmorphone (DILAUDID) injection 1 mg  1 mg IntraVENous Q4H PRN    lisinopriL (PRINIVIL, ZESTRIL) tablet 10 mg  10 mg Oral DAILY    hydrALAZINE (APRESOLINE) tablet 25 mg  25 mg Oral TID    thiamine mononitrate (B-1) tablet 100 mg  100 mg Oral DAILY    chlordiazePOXIDE (LIBRIUM) capsule 25 mg  25 mg Oral TID    acetaminophen (TYLENOL) solution 650 mg  650 mg Oral Q4H PRN    0.9% sodium chloride infusion 1,000 mL  1,000 mL IntraVENous CONTINUOUS    enoxaparin (LOVENOX) injection 40 mg  40 mg SubCUTAneous Q24H       Review of Systems:   Review of Systems   Constitutional: Negative for chills, diaphoresis, fever, malaise/fatigue and weight loss. HENT: Negative for ear discharge, ear pain, hearing loss, nosebleeds, sinus pain and tinnitus. Respiratory: Negative for cough, hemoptysis, sputum production, shortness of breath and wheezing. Cardiovascular: Negative for chest pain, palpitations, orthopnea, claudication and leg swelling. Gastrointestinal: Negative for abdominal pain, constipation, diarrhea, heartburn, nausea and vomiting. Genitourinary: Negative for dysuria, flank pain, frequency, hematuria and urgency. Musculoskeletal: Negative for back pain, falls, joint pain, myalgias and neck pain.         Positive for right knee pain and left ankle pain   Neurological: Negative for dizziness, tingling, focal weakness, seizures, weakness and headaches. Psychiatric/Behavioral: Negative for depression, hallucinations, memory loss, substance abuse and suicidal ideas. The patient is not nervous/anxious. Objective:   Physical Exam  Constitutional:       General: He is not in acute distress. Appearance: Normal appearance. He is normal weight. HENT:      Head: Normocephalic and atraumatic. Mouth/Throat:      Mouth: Mucous membranes are moist.      Pharynx: Oropharynx is clear. Eyes:      Pupils: Pupils are equal, round, and reactive to light. Neck:      Musculoskeletal: No neck rigidity. Cardiovascular:      Rate and Rhythm: Normal rate and regular rhythm. Heart sounds: No murmur. No friction rub. No gallop. Pulmonary:      Effort: Pulmonary effort is normal. No respiratory distress. Breath sounds: Normal breath sounds. No wheezing or rales. Abdominal:      General: Bowel sounds are normal. There is no distension. Palpations: Abdomen is soft. Tenderness: There is no abdominal tenderness. There is no rebound. Musculoskeletal: Normal range of motion. General: No swelling, tenderness, deformity or signs of injury. Comments: Painful ROM to right knee and left ankle   Skin:     General: Skin is warm and dry. Coloration: Skin is not jaundiced. Findings: No bruising, erythema or rash. Neurological:      General: No focal deficit present. Mental Status: He is alert and oriented to person, place, and time. Sensory: No sensory deficit. Motor: No weakness. Gait: Gait normal.   Psychiatric:         Mood and Affect: Mood normal.         Behavior: Behavior normal.         Thought Content:  Thought content normal.          Visit Vitals  BP (!) 159/98 (BP 1 Location: Right arm, BP Patient Position: At rest)   Pulse 95   Temp 98.3 °F (36.8 °C)   Resp 20   Ht 6' 1\" (1.854 m)   Wt 116.1 kg (256 lb)   SpO2 97%   BMI 33.78 kg/m²         Data Review:       Recent Days:  Recent Labs 11/03/20  0724 11/02/20  0852 11/01/20  1003   WBC 9.4 9.4 10.4   HGB 11.0* 11.4* 11.5*   HCT 32.7* 33.3* 33.8*    187 171     Recent Labs     11/03/20  0724 11/02/20  0852 11/01/20  1003    140 139   K 3.5 3.7 3.5   * 107 107   CO2 26 27 28   * 115* 133*   BUN 6 6 5*   CREA 0.76 0.74 0.76   CA 9.0 8.9 8.5         Assessment/Plan   1. Acute Alcoholic Pancreatitis    2. Alcohol Abuse    3. Acute Gout attack    4. Diabetes Type 2    5.  Hypertensiob                Care Plan discussed with:       Bill Abdul, NP

## 2020-11-03 NOTE — PROGRESS NOTES
Hospitalist Progress Note             Daily Progress Note: 11/3/2020      Subjective: The patient is seen for follow up.     57 y/o male with PMHx of Diabetes type 2, alcohol abuse, HLD, HTN was seen in the ED 10/29/2020 for diffuse abdominal pain, nausea, and vomiting. CT abdomen revealed acute pancreatitis. Lipase was elevated 3000. He was admitted for further management. Patient seen and examined at bedside, denies abdominal pain. Reports right knee and left ankle pain which he relates to gout flair up. No acute events overnight. Problem List:  Patient Active Problem List   Diagnosis Code    Acute pancreatitis K85.90         Medications reviewed  Current Facility-Administered Medications   Medication Dose Route Frequency    colchicine tablet 0.6 mg  0.6 mg Oral DAILY    predniSONE (DELTASONE) tablet 20 mg  20 mg Oral DAILY WITH BREAKFAST    cyanocobalamin (VITAMIN B12) tablet 1,000 mcg  1,000 mcg Oral DAILY    HYDROmorphone (DILAUDID) injection 1 mg  1 mg IntraVENous Q4H PRN    lisinopriL (PRINIVIL, ZESTRIL) tablet 10 mg  10 mg Oral DAILY    hydrALAZINE (APRESOLINE) tablet 25 mg  25 mg Oral TID    thiamine mononitrate (B-1) tablet 100 mg  100 mg Oral DAILY    chlordiazePOXIDE (LIBRIUM) capsule 25 mg  25 mg Oral TID    acetaminophen (TYLENOL) solution 650 mg  650 mg Oral Q4H PRN    0.9% sodium chloride infusion 1,000 mL  1,000 mL IntraVENous CONTINUOUS    enoxaparin (LOVENOX) injection 40 mg  40 mg SubCUTAneous Q24H       Review of Systems:   Review of Systems   Constitutional: Negative for chills, fever, malaise/fatigue and weight loss. Respiratory: Negative for cough, hemoptysis, shortness of breath and wheezing. Cardiovascular: Negative for chest pain, palpitations and orthopnea. Gastrointestinal: Negative for abdominal pain, constipation, diarrhea, nausea and vomiting. Genitourinary: Negative for dysuria, hematuria and urgency.    Musculoskeletal: Positive for joint pain (right knee, left ankle- gout flair). Negative for myalgias. Skin: Negative for itching and rash. Neurological: Negative for dizziness, weakness and headaches. Objective:   Physical Exam  Constitutional:       General: He is not in acute distress. Appearance: He is obese. He is not toxic-appearing. HENT:      Head: Normocephalic and atraumatic. Eyes:      Extraocular Movements: Extraocular movements intact. Conjunctiva/sclera: Conjunctivae normal.      Pupils: Pupils are equal, round, and reactive to light. Cardiovascular:      Rate and Rhythm: Normal rate and regular rhythm. Pulmonary:      Effort: Pulmonary effort is normal. No respiratory distress. Breath sounds: Normal breath sounds. Abdominal:      General: Bowel sounds are normal.      Palpations: Abdomen is soft. Tenderness: There is no abdominal tenderness. There is no guarding. Musculoskeletal:         General: Swelling (mild, right knee) and tenderness (right knee and left ankle) present. No deformity or signs of injury. Right lower leg: No edema. Left lower leg: No edema. Skin:     General: Skin is warm and dry. Findings: No erythema, lesion or rash. Neurological:      General: No focal deficit present. Mental Status: He is alert and oriented to person, place, and time.           Visit Vitals  BP (!) 159/98 (BP 1 Location: Right arm, BP Patient Position: At rest)   Pulse 95   Temp 98.3 °F (36.8 °C)   Resp 20   Ht 6' 1\" (1.854 m)   Wt 256 lb (116.1 kg)   SpO2 97%   BMI 33.78 kg/m²         Data Review:       Recent Days:  Recent Labs     11/03/20  0724 11/02/20  0852 11/01/20  1003   WBC 9.4 9.4 10.4   HGB 11.0* 11.4* 11.5*   HCT 32.7* 33.3* 33.8*    187 171     Recent Labs     11/03/20  0724 11/02/20  0852 11/01/20  1003    140 139   K 3.5 3.7 3.5   * 107 107   CO2 26 27 28   * 115* 133*   BUN 6 6 5*   CREA 0.76 0.74 0.76   CA 9.0 8.9 8.5 No results for input(s): PH, PCO2, PO2, HCO3, FIO2 in the last 72 hours. Assessment/Plan   1. Acute alcoholic Pancreatitis  Improving  Lipase on presentation 10/29/20 >3000. Lipase 11/2/20 is 252. Start low-fat diet, continue IV fluids  Repeat Lipase in a.m.    2. Acute Gout Attack  Patient reports history of gout and current gout flair of right knee and left ankle. Start Colchicine and prednisone     3. Alcohol abuse  Reports he drinks 7 drinks a day  Counseled patient on abstaining from alcohol use  Continue librium, folic acid, thiamine    4. Diabetes type 2  Serum glucose 137 this a.m. Metformin is being held during hospital stay  Continue Accu-checks with SSI    5. HTN  BP was 159/98 this a.m. Likely elevated second to pain. Continue hydralazine and lisinopril. DVT prophylaxis - Continue lovenox   CBC, CMP, lipase labs in a.m. Comments: Pancreatitis symptoms significantly improved, will start low-fat diet. In relation to acute gout attack, will manage symptomatically with prednisone and colchicine. Anticipate discharge tomorrow a.m. if patient continues to improve.     AMY Colbert

## 2020-11-03 NOTE — PROGRESS NOTES
Problem: Falls - Risk of  Goal: *Absence of Falls  Description: Document Prabhjot Otero Fall Risk and appropriate interventions in the flowsheet.   Outcome: Progressing Towards Goal  Note: Fall Risk Interventions:            Medication Interventions: Patient to call before getting OOB, Bed/chair exit alarm         History of Falls Interventions: Bed/chair exit alarm         Problem: Patient Education: Go to Patient Education Activity  Goal: Patient/Family Education  Outcome: Progressing Towards Goal

## 2020-11-04 VITALS
WEIGHT: 256 LBS | SYSTOLIC BLOOD PRESSURE: 145 MMHG | HEIGHT: 73 IN | TEMPERATURE: 97.7 F | DIASTOLIC BLOOD PRESSURE: 81 MMHG | RESPIRATION RATE: 18 BRPM | HEART RATE: 93 BPM | OXYGEN SATURATION: 97 % | BODY MASS INDEX: 33.93 KG/M2

## 2020-11-04 LAB
ALBUMIN SERPL-MCNC: 3.1 G/DL (ref 3.5–5)
ALBUMIN/GLOB SERPL: 0.7 {RATIO} (ref 1.1–2.2)
ALP SERPL-CCNC: 76 U/L (ref 45–117)
ALT SERPL-CCNC: 30 U/L (ref 12–78)
ANION GAP SERPL CALC-SCNC: 4 MMOL/L (ref 5–15)
AST SERPL W P-5'-P-CCNC: 30 U/L (ref 15–37)
BASOPHILS # BLD: 0 K/UL (ref 0–0.1)
BASOPHILS NFR BLD: 0 % (ref 0–1)
BILIRUB SERPL-MCNC: 0.6 MG/DL (ref 0.2–1)
BUN SERPL-MCNC: 7 MG/DL (ref 6–20)
BUN/CREAT SERPL: 7 (ref 12–20)
CA-I BLD-MCNC: 9.4 MG/DL (ref 8.5–10.1)
CHLORIDE SERPL-SCNC: 107 MMOL/L (ref 97–108)
CO2 SERPL-SCNC: 29 MMOL/L (ref 21–32)
CREAT SERPL-MCNC: 1 MG/DL (ref 0.7–1.3)
DIFFERENTIAL METHOD BLD: ABNORMAL
EOSINOPHIL # BLD: 0.1 K/UL (ref 0–0.4)
EOSINOPHIL NFR BLD: 1 % (ref 0–7)
ERYTHROCYTE [DISTWIDTH] IN BLOOD BY AUTOMATED COUNT: 13.9 % (ref 11.5–14.5)
GLOBULIN SER CALC-MCNC: 4.3 G/DL (ref 2–4)
GLUCOSE SERPL-MCNC: 259 MG/DL (ref 65–100)
HCT VFR BLD AUTO: 34.7 % (ref 36.6–50.3)
HGB BLD-MCNC: 11.6 G/DL (ref 12.1–17)
IMM GRANULOCYTES # BLD AUTO: 0 K/UL (ref 0–0.04)
IMM GRANULOCYTES NFR BLD AUTO: 0 % (ref 0–0.5)
LIPASE SERPL-CCNC: 425 U/L (ref 73–393)
LYMPHOCYTES # BLD: 0.9 K/UL (ref 0.8–3.5)
LYMPHOCYTES NFR BLD: 10 % (ref 12–49)
MCH RBC QN AUTO: 32.1 PG (ref 26–34)
MCHC RBC AUTO-ENTMCNC: 33.4 G/DL (ref 30–36.5)
MCV RBC AUTO: 96.1 FL (ref 80–99)
MONOCYTES # BLD: 0.7 K/UL (ref 0–1)
MONOCYTES NFR BLD: 7 % (ref 5–13)
NEUTS SEG # BLD: 7.9 K/UL (ref 1.8–8)
NEUTS SEG NFR BLD: 82 % (ref 32–75)
PLATELET # BLD AUTO: 272 K/UL (ref 150–400)
PMV BLD AUTO: 10.5 FL (ref 8.9–12.9)
POTASSIUM SERPL-SCNC: 3.6 MMOL/L (ref 3.5–5.1)
PROT SERPL-MCNC: 7.4 G/DL (ref 6.4–8.2)
RBC # BLD AUTO: 3.61 M/UL (ref 4.1–5.7)
SODIUM SERPL-SCNC: 140 MMOL/L (ref 136–145)
WBC # BLD AUTO: 9.5 K/UL (ref 4.1–11.1)

## 2020-11-04 PROCEDURE — 97116 GAIT TRAINING THERAPY: CPT

## 2020-11-04 PROCEDURE — 74011636637 HC RX REV CODE- 636/637: Performed by: NURSE PRACTITIONER

## 2020-11-04 PROCEDURE — 36415 COLL VENOUS BLD VENIPUNCTURE: CPT

## 2020-11-04 PROCEDURE — 74011250636 HC RX REV CODE- 250/636: Performed by: HOSPITALIST

## 2020-11-04 PROCEDURE — 74011250637 HC RX REV CODE- 250/637: Performed by: HOSPITALIST

## 2020-11-04 PROCEDURE — 85025 COMPLETE CBC W/AUTO DIFF WBC: CPT

## 2020-11-04 PROCEDURE — 80053 COMPREHEN METABOLIC PANEL: CPT

## 2020-11-04 PROCEDURE — 74011250637 HC RX REV CODE- 250/637: Performed by: NURSE PRACTITIONER

## 2020-11-04 PROCEDURE — 97161 PT EVAL LOW COMPLEX 20 MIN: CPT

## 2020-11-04 PROCEDURE — 83690 ASSAY OF LIPASE: CPT

## 2020-11-04 RX ORDER — LISINOPRIL 40 MG/1
40 TABLET ORAL DAILY
Qty: 30 TAB | Refills: 0 | Status: SHIPPED | OUTPATIENT
Start: 2020-11-05

## 2020-11-04 RX ORDER — COLCHICINE 0.6 MG/1
0.6 TABLET ORAL DAILY
Qty: 5 TAB | Refills: 0 | Status: SHIPPED | OUTPATIENT
Start: 2020-11-05 | End: 2020-11-10

## 2020-11-04 RX ADMIN — COLCHICINE 0.6 MG: 0.6 TABLET, FILM COATED ORAL at 08:06

## 2020-11-04 RX ADMIN — LISINOPRIL 10 MG: 10 TABLET ORAL at 08:06

## 2020-11-04 RX ADMIN — CYANOCOBALAMIN TAB 500 MCG 1000 MCG: 500 TAB at 08:06

## 2020-11-04 RX ADMIN — ACETAMINOPHEN ORAL SOLUTION 650 MG: 650 SOLUTION ORAL at 04:17

## 2020-11-04 RX ADMIN — PREDNISONE 20 MG: 20 TABLET ORAL at 08:06

## 2020-11-04 RX ADMIN — ENOXAPARIN SODIUM 40 MG: 40 INJECTION SUBCUTANEOUS at 04:18

## 2020-11-04 RX ADMIN — THIAMINE HCL TAB 100 MG 100 MG: 100 TAB at 08:06

## 2020-11-04 RX ADMIN — HYDRALAZINE HYDROCHLORIDE 25 MG: 25 TABLET, FILM COATED ORAL at 08:06

## 2020-11-04 NOTE — ROUTINE PROCESS
Discharge plan of care/case management plan validated with provider discharge order. Pt. Alert and oriented. VSS. IV removed, tip intact. Medications sent to [facility]. Pt. Verbalized understanding if discharge, medication, and follow up instructions. [address] unable to make follow-up appt with pts. PCP; verbalized understanding of the importance of follow-up within one week. Pt. Ambulated to [facility] and discharged home/self-care with wife. No acute distress noted.

## 2020-11-04 NOTE — PROGRESS NOTES
Hospitalist         Discharge Summary Note: 11/4/2020      Subjective: The patient is seen for follow  Up.     55 y/o male with PMHx of Diabetes type 2, alcohol abuse, HLD, HTN was seen in the ED 10/29/2020 for diffuse abdominal pain, nausea, and vomiting. CT abdomen revealed acute pancreatitis. Lipase was elevated 3000. He was diagnosed with acute alcohol  Pancreatitis and was treated as follows- bowel rest with NPO status, diet advanced to clear liquids which he tolerated and subsequently advanced to low fat which he tolerated. Lipase level trended down to 252 and abdominal pain resolved. He was counseled extensively about the importance of complete abstinence from alcohol consumption. He had no episodes of alcohol withdrawal during hospital stay. Due to history of alcohol abuse, he had an acute gout episode while he was hospitalized and was treated with prednisone and colchicine.  Symptoms have improved significantly and he is stable enough to discharge home.          Problem List:  Patient Active Problem List   Diagnosis Code    Acute pancreatitis K85.90         Medications reviewed  Current Facility-Administered Medications   Medication Dose Route Frequency    colchicine tablet 0.6 mg  0.6 mg Oral DAILY    predniSONE (DELTASONE) tablet 20 mg  20 mg Oral DAILY WITH BREAKFAST    cyanocobalamin (VITAMIN B12) tablet 1,000 mcg  1,000 mcg Oral DAILY    lisinopriL (PRINIVIL, ZESTRIL) tablet 10 mg  10 mg Oral DAILY    hydrALAZINE (APRESOLINE) tablet 25 mg  25 mg Oral TID    thiamine mononitrate (B-1) tablet 100 mg  100 mg Oral DAILY    chlordiazePOXIDE (LIBRIUM) capsule 25 mg  25 mg Oral TID    acetaminophen (TYLENOL) solution 650 mg  650 mg Oral Q4H PRN    0.9% sodium chloride infusion 1,000 mL  1,000 mL IntraVENous CONTINUOUS    enoxaparin (LOVENOX) injection 40 mg  40 mg SubCUTAneous Q24H       Review of Systems:   Review of Systems   Constitutional: Negative for chills, diaphoresis, fever and malaise/fatigue. HENT: Negative for congestion, ear pain, nosebleeds and sore throat. Eyes: Negative for blurred vision, pain, discharge and redness. Respiratory: Negative for cough, hemoptysis, shortness of breath and wheezing. Cardiovascular: Negative for chest pain, palpitations and leg swelling. Gastrointestinal: Negative for abdominal pain, constipation, diarrhea, nausea and vomiting. Genitourinary: Negative for dysuria, frequency, hematuria and urgency. Musculoskeletal: Negative for back pain, joint pain and myalgias. Skin: Negative for itching and rash. Neurological: Negative for dizziness, loss of consciousness, weakness and headaches. Objective:   Physical Exam  Constitutional:       General: He is not in acute distress. Appearance: Normal appearance. He is not toxic-appearing or diaphoretic. HENT:      Head: Normocephalic and atraumatic. Nose: Nose normal.   Eyes:      Extraocular Movements: Extraocular movements intact. Conjunctiva/sclera: Conjunctivae normal.      Pupils: Pupils are equal, round, and reactive to light. Cardiovascular:      Rate and Rhythm: Normal rate and regular rhythm. Pulses: Normal pulses. Heart sounds: Normal heart sounds. Pulmonary:      Effort: Pulmonary effort is normal. No respiratory distress. Breath sounds: Normal breath sounds. No wheezing. Abdominal:      General: Bowel sounds are normal. There is no distension. Palpations: Abdomen is soft. There is no mass. Tenderness: There is no abdominal tenderness. There is no guarding. Skin:     General: Skin is warm and dry. Neurological:      General: No focal deficit present. Mental Status: He is alert and oriented to person, place, and time. Motor: No weakness.           Visit Vitals  BP (!) 147/84 (BP 1 Location: Left arm, BP Patient Position: At rest)   Pulse 80   Temp 97.4 °F (36.3 °C)   Resp 18   Ht 6' 0.99\" (1.854 m)   Wt 256 lb (116.1 kg)   SpO2 97%   BMI 33.78 kg/m²        Data Review:       Recent Days:  Recent Labs     11/03/20  0724 11/02/20  0852   WBC 9.4 9.4   HGB 11.0* 11.4*   HCT 32.7* 33.3*    187     Recent Labs     11/03/20  0724 11/02/20  0852    140   K 3.5 3.7   * 107   CO2 26 27   * 115*   BUN 6 6   CREA 0.76 0.74   CA 9.0 8.9             Assessment/Plan   1. Acute alcoholic Pancreatitis  Resolved- lipase continues to stay in 200s which is reassuring. Lipase on presentation 10/29/20 >3000. Lipase 11/03/20 is 252.      2. Acute Gout Attack   Uric acid 5.0 on 11/04/2020 - WNL  Colchicine PO for 5 more days   Abstain from alcohol      3. Alcohol abuse  Reports having 7 drinks a day  Counseled patient extensively on completely abstaining from alcohol use      4. Diabetes type 2  Serum glucose 137 this a.m. Resume Metformin for at home regimen     5. HTN  BP was 147/84 this a.m. Likely elevated second to pain. Continue with lisinopril at home.      Comments: Recommended Mr. Elder Hazel to follow up with PCP Dr. Anuj Lilly within 3-5 days    AMY Thompson

## 2020-11-04 NOTE — PROGRESS NOTES
PHYSICAL THERAPY EVALUATION  Patient: Isac Myers (59 y.o. male)  Date: 11/4/2020  Primary Diagnosis: Acute pancreatitis [K85.90]        Precautions: pain and gout flare up. ASSESSMENT  Based on the objective data described below, 56 y.o. male history of prediabetes on treatment, hyperlipidemia presents to the emergency room complaining of abdominal pain started in the epigastric area and then diffuse this morning when he woke up. Denies anything in particular trigger, but he thinks may be related to something he ate. Since then unable to keep anything down, nausea and vomiting. patient adm for further evaluation, DX acute pancriatitis. patient agreeable to therapy eval. his wife present in the room during the session. patient reports rt knee and left foot pain due to gout flare up. he has a HX of gout and takes meds when required. patient able to amb in hallways with cg to sba. slow and steady gait. patient is at baseline. no skilled therapy required. Current Level of Function Impacting Discharge (mobility/balance): gen decreased in endurance  Other factors to consider for discharge: dietary adjustment   Patient will benefit from skilled therapy intervention to address the above noted impairments. PLAN :  Recommendations and Planned Interventions:   Frequency/Duration: Patient will be followed by physical therapy:  DC after eval  Recommendation for discharge: (in order for the patient to meet his/her long term goals)  No skilled physical therapy/ follow up rehabilitation needs identified at this time. This discharge recommendation:  Has been made in collaboration with the attending provider and/or case management    IF patient discharges home will need the following DME: none         SUBJECTIVE:   Patient stated I have knee and foot pain due to gout.     OBJECTIVE DATA SUMMARY:   HISTORY:    Past Medical History:   Diagnosis Date    Other ill-defined conditions(593.55) 2009    GI bleed     Past Surgical History:   Procedure Laterality Date    HX ORTHOPAEDIC Left     knee cartlidge removed    HX ORTHOPAEDIC Left     elbow fx repair       Personal factors and/or comorbidities impacting plan of care:   Home Situation  Home Environment: Private residence  # Steps to Enter: 4  One/Two Story Residence: Two story  Living Alone: No  Support Systems: Spouse/Significant Other/Partner  Patient Expects to be Discharged to[de-identified] Private residence  Current DME Used/Available at Home: None    EXAMINATION/PRESENTATION/DECISION MAKING:   Critical Behavior:              Hearing: Auditory  Auditory Impairment: None  Skin:  intact where visible  Edema: rt knee and left ankle  Range Of Motion:  AROM: Generally decreased, functional                       Strength:    Strength: Generally decreased, functional                    Tone & Sensation:                  Sensation: Impaired(hypersensitivity due to gout flare up)               Functional Mobility:  Bed Mobility:  Rolling: Independent  Supine to Sit: Independent  Sit to Supine: Independent  Scooting: Independent  Transfers:  Sit to Stand: Modified independent  Stand to Sit: Modified independent  Stand Pivot Transfers: Modified independent     Bed to Chair: Modified independent              Balance:   Sitting: Intact  Standing: Intact; With support  Ambulation/Gait Training:  Distance (ft): 150 Feet (ft)  Assistive Device: (IV pole)  Ambulation - Level of Assistance: Stand-by assistance     Gait Description (WDL): Exceptions to WDL           Base of Support: Narrowed     Speed/[place]: Slow  Step Length: Left shortened;Right shortened                        Therapeutic Exercises:   Not today    Functional Measure:  [address] [facility] [address] [address] AM-PAC 6 Clicks         Basic Mobility Inpatient Short Form  How much difficulty does the patient currently have. .. Unable A Lot A Little None   1. Turning over in bed (including adjusting bedclothes, sheets and blankets)?    [] 1   [] 2   [] 3 [x] 4   2. Sitting down on and standing up from a chair with arms ( e.g., wheelchair, bedside commode, etc.)   [] 1   [] 2   [] 3   [x] 4   3. Moving from lying on back to sitting on the side of the bed? [] 1   [] 2   [] 3   [x] 4          How much help from another person does the patient currently need. .. Total A Lot A Little None   4. Moving to and from a bed to a chair (including a wheelchair)? [] 1   [] 2   [] 3   [x] 4   5. Need to walk in hospital room? [] 1   [] 2   [] 3   [x] 4   6. Climbing 3-5 steps with a railing? [] 1   [] 2   [] 3   [x] 4   © , Trustees of [facility] [place] [facility] [employer], under license to [employer]. All rights reserved     Score:  Initial: 24 Most Recent: X (Date: 2020 )   Interpretation of Tool:  Represents activities that are increasingly more difficult (i.e. Bed mobility, Transfers, Gait). Score 24 23 22-20 19-15 14-10 9-7 6   Modifier CH CI CJ CK CL CM CN           Physical Therapy Evaluation Charge Determination   History Examination Presentation Decision-Making   LOW Complexity : Zero comorbidities / personal factors that will impact the outcome / POC LOW Complexity : 1-2 Standardized tests and measures addressing body structure, function, activity limitation and / or participation in recreation  LOW Complexity : Stable, uncomplicated  LOW Complexity : FOTO score of       Based on the above components, the patient evaluation is determined to be of the following complexity level: LOW     Pain Ratin/10 in patient's words. Activity Tolerance:   Good  Please refer to the flowsheet for vital signs taken during this treatment. After treatment patient left in no apparent distress:   Sitting in chair    COMMUNICATION/EDUCATION:   The patients plan of care was discussed with: Registered nurse. Fall prevention education was provided and the patient/caregiver indicated understanding.     Thank you for this referral.  Lyubov Moore PT   Time Calculation: 20 mins

## 2020-11-04 NOTE — DISCHARGE INSTRUCTIONS
Patient Education        Pancreatitis: Care Instructions  Your Care Instructions     The pancreas is an organ behind the stomach. It makes hormones and enzymes to help your body digest food. But if these enzymes attack the pancreas, it can get inflamed. This is called pancreatitis. Most cases are caused by gallstones or by heavy alcohol use. If you take care of yourself at home, it will help you get better. It will also help you avoid more problems with your pancreas. Follow-up care is a key part of your treatment and safety. Be sure to make and go to all appointments, and call your doctor if you are having problems. It's also a good idea to know your test results and keep a list of the medicines you take. How can you care for yourself at home? · Drink clear liquids and eat bland foods until you feel better. [place] foods include rice, dry toast, and crackers. They also include bananas and applesauce. · Eat a low-fat diet until your doctor says your pancreas is healed. · Do not drink alcohol. Tell your doctor if you need help to quit. Counseling, support groups, and sometimes medicines can help you stay sober. · Be safe with medicines. Read and follow all instructions on the label. ? If the doctor gave you a prescription medicine for pain, take it as prescribed. ? If you are not taking a prescription pain medicine, ask your doctor if you can take an over-the-counter medicine. · If your doctor prescribed antibiotics, take them as directed. Do not stop taking them just because you feel better. You need to take the full course of antibiotics. · Get extra rest until you feel better. To prevent future problems with your pancreas  · Do not drink alcohol. · Tell your doctors and pharmacist that you've had pancreatitis. They can help you avoid medicines that may cause this problem again. When should you call for help? Call 911 anytime you think you may need emergency care.  For example, call if:    · You vomit blood or what looks like coffee grounds.     · Your stools are maroon or very bloody. Call your doctor now or seek immediate medical care if:    · You have new or worse belly pain.     · Your stools are black and look like tar, or they have streaks of blood.     · You are vomiting. Watch closely for changes in your health, and be sure to contact your doctor if:    · You do not get better as expected. Where can you learn more? Go to http://www.gray.com/  Enter J381 in the search box to learn more about \"Pancreatitis: Care Instructions. \"  Current as of: April 15, 2020               Content Version: 12.6  © 3693-0146 [employer]. Care instructions adapted under license by [employer] (which disclaims liability or warranty for this information). If you have questions about a medical condition or this instruction, always ask your healthcare professional. [address] any warranty or liability for your use of this information.

## 2020-11-04 NOTE — PROGRESS NOTES
Patient will have to make follow up appointment . Dr. Fausto Stapleton requires patient or family member to make appointments.  Information for Dr. Fausto Stapleton was provided to patient

## 2020-11-04 NOTE — PROGRESS NOTES
Hospitalist         Discharge Summary Note: 11/4/2020      Subjective: The patient is seen for follow  Up.     55 y/o male with PMHx of Diabetes type 2, alcohol abuse, HLD, HTN was seen in the ED 10/29/2020 for diffuse abdominal pain, nausea, and vomiting. CT abdomen revealed acute pancreatitis. Lipase was elevated 3000. He was diagnosed with acute alcohol  Pancreatitis and was treated as follows- bowel rest with NPO status, diet advanced to clear liquids which he tolerated and subsequently advanced to low fat which he tolerated. Lipase level trended down to 252 and abdominal pain resolved. He was counseled extensively about the importance of complete abstinence from alcohol consumption. He had no episodes of alcohol withdrawal during hospital stay. Due to history of alcohol abuse, he had an acute gout episode while he was hospitalized and was treated with prednisone and colchicine.  Symptoms have improved significantly and he is stable enough to discharge home.          Problem List:  Patient Active Problem List   Diagnosis Code    Acute pancreatitis K85.90         Medications reviewed  Current Facility-Administered Medications   Medication Dose Route Frequency    colchicine tablet 0.6 mg  0.6 mg Oral DAILY    predniSONE (DELTASONE) tablet 20 mg  20 mg Oral DAILY WITH BREAKFAST    cyanocobalamin (VITAMIN B12) tablet 1,000 mcg  1,000 mcg Oral DAILY    lisinopriL (PRINIVIL, ZESTRIL) tablet 10 mg  10 mg Oral DAILY    hydrALAZINE (APRESOLINE) tablet 25 mg  25 mg Oral TID    thiamine mononitrate (B-1) tablet 100 mg  100 mg Oral DAILY    chlordiazePOXIDE (LIBRIUM) capsule 25 mg  25 mg Oral TID    acetaminophen (TYLENOL) solution 650 mg  650 mg Oral Q4H PRN    0.9% sodium chloride infusion 1,000 mL  1,000 mL IntraVENous CONTINUOUS    enoxaparin (LOVENOX) injection 40 mg  40 mg SubCUTAneous Q24H       Review of Systems:   Review of Systems   Constitutional: Negative for chills, diaphoresis, fever and malaise/fatigue. HENT: Negative for congestion, ear pain, nosebleeds and sore throat. Eyes: Negative for blurred vision, pain, discharge and redness. Respiratory: Negative for cough, hemoptysis, shortness of breath and wheezing. Cardiovascular: Negative for chest pain, palpitations and leg swelling. Gastrointestinal: Negative for abdominal pain, constipation, diarrhea, nausea and vomiting. Genitourinary: Negative for dysuria, frequency, hematuria and urgency. Musculoskeletal: Negative for back pain, joint pain and myalgias. Skin: Negative for itching and rash. Neurological: Negative for dizziness, loss of consciousness, weakness and headaches. Objective:   Physical Exam  Constitutional:       General: He is not in acute distress. Appearance: Normal appearance. He is not toxic-appearing or diaphoretic. HENT:      Head: Normocephalic and atraumatic. Nose: Nose normal.   Eyes:      Extraocular Movements: Extraocular movements intact. Conjunctiva/sclera: Conjunctivae normal.      Pupils: Pupils are equal, round, and reactive to light. Cardiovascular:      Rate and Rhythm: Normal rate and regular rhythm. Pulses: Normal pulses. Heart sounds: Normal heart sounds. Pulmonary:      Effort: Pulmonary effort is normal. No respiratory distress. Breath sounds: Normal breath sounds. No wheezing. Abdominal:      General: Bowel sounds are normal. There is no distension. Palpations: Abdomen is soft. There is no mass. Tenderness: There is no abdominal tenderness. There is no guarding. Skin:     General: Skin is warm and dry. Neurological:      General: No focal deficit present. Mental Status: He is alert and oriented to person, place, and time. Motor: No weakness.           Visit Vitals  BP (!) 145/81 (BP 1 Location: Left arm, BP Patient Position: At rest)   Pulse 93   Temp 97.7 °F (36.5 °C)   Resp 18   Ht 6' 0.99\" (1.854 m)   Wt 116.1 kg (256 lb)   SpO2 97%   BMI 33.78 kg/m²        Data Review:       Recent Days:  Recent Labs     11/03/20  0724 11/02/20  0852   WBC 9.4 9.4   HGB 11.0* 11.4*   HCT 32.7* 33.3*    187     Recent Labs     11/03/20  0724 11/02/20  0852    140   K 3.5 3.7   * 107   CO2 26 27   * 115*   BUN 6 6   CREA 0.76 0.74   CA 9.0 8.9             Assessment/Plan   1. Acute alcoholic Pancreatitis  Resolved- lipase continues to stay in 200s which is reassuring. Lipase on presentation 10/29/20 >3000. Lipase 11/03/20 is 252.      2. Acute Gout Attack   Uric acid 5.0 on 11/04/2020 - WNL  Colchicine PO for 5 more days   Abstain from alcohol      3. Alcohol abuse  Reports having 7 drinks a day  Counseled patient extensively on completely abstaining from alcohol use      4. Diabetes type 2  Serum glucose 137 this a.m. Resume Metformin for at home regimen     5. HTN  BP was 147/84 this a.m. Likely elevated second to pain. Continue with lisinopril at home.      Comments: Recommended  Zaire Morgan to follow up with PCP Dr. Thomas Rizvi within 3-5 days

## 2020-11-04 NOTE — DISCHARGE SUMMARY
Hospitalist         Discharge Summary Note: 11/4/2020      Subjective:   57 y/o male with PMHx of Diabetes type 2, alcohol abuse, HLD, HTN was seen in the ED 10/29/2020 for diffuse abdominal pain, nausea, and vomiting. CT abdomen revealed acute pancreatitis. Lipase was elevated 3000. He was diagnosed with acute alcohol  Pancreatitis and was treated as follows- bowel rest with NPO status, diet advanced to clear liquids which he tolerated and subsequently advanced to low fat which he tolerated. Lipase level trended down to 252 and abdominal pain resolved.      He was counseled extensively about the importance of complete abstinence from alcohol consumption. He had no episodes of alcohol withdrawal during hospital stay. Due to history of alcohol abuse, he had an acute gout episode while he was hospitalized and was treated with prednisone and colchicine. Symptoms have improved significantly and he is stable enough to discharge home. Problem List:  Patient Active Problem List   Diagnosis Code    Acute pancreatitis K85.90         Medications reviewed  Current Facility-Administered Medications   Medication Dose Route Frequency    colchicine tablet 0.6 mg  0.6 mg Oral DAILY    predniSONE (DELTASONE) tablet 20 mg  20 mg Oral DAILY WITH BREAKFAST    cyanocobalamin (VITAMIN B12) tablet 1,000 mcg  1,000 mcg Oral DAILY    lisinopriL (PRINIVIL, ZESTRIL) tablet 10 mg  10 mg Oral DAILY    hydrALAZINE (APRESOLINE) tablet 25 mg  25 mg Oral TID    thiamine mononitrate (B-1) tablet 100 mg  100 mg Oral DAILY    chlordiazePOXIDE (LIBRIUM) capsule 25 mg  25 mg Oral TID    acetaminophen (TYLENOL) solution 650 mg  650 mg Oral Q4H PRN    0.9% sodium chloride infusion 1,000 mL  1,000 mL IntraVENous CONTINUOUS    enoxaparin (LOVENOX) injection 40 mg  40 mg SubCUTAneous Q24H       Review of Systems:   ROS    Constitutional: Negative for chills, diaphoresis, fever and malaise/fatigue.    HENT: Negative for congestion, ear pain, nosebleeds and sore throat. Eyes: Negative for blurred vision, pain, discharge and redness. Respiratory: Negative for cough, hemoptysis, shortness of breath and wheezing. Cardiovascular: Negative for chest pain, palpitations and leg swelling. Gastrointestinal: Negative for abdominal pain, constipation, diarrhea, nausea and vomiting. Genitourinary: Negative for dysuria, frequency, hematuria and urgency. Musculoskeletal: Negative for back pain, joint pain and myalgias. Skin: Negative for itching and rash. Neurological: Negative for dizziness, loss of consciousness, weakness and headaches.        Objective:   Physical Exam     Visit Vitals  BP (!) 145/81 (BP 1 Location: Left arm, BP Patient Position: At rest)   Pulse 93   Temp 97.7 °F (36.5 °C)   Resp 18   Ht 6' 0.99\" (1.854 m)   Wt 116.1 kg (256 lb)   SpO2 97%   BMI 33.78 kg/m²      Physical Exam  Constitutional:       General: He is not in acute distress. Appearance: Normal appearance. He is not toxic-appearing or diaphoretic. HENT:      Head: Normocephalic and atraumatic. Nose: Nose normal.   Eyes:      Extraocular Movements: Extraocular movements intact. Conjunctiva/sclera: Conjunctivae normal.      Pupils: Pupils are equal, round, and reactive to light. Cardiovascular:      Rate and Rhythm: Normal rate and regular rhythm. Pulses: Normal pulses. Heart sounds: Normal heart sounds. Pulmonary:      Effort: Pulmonary effort is normal. No respiratory distress. Breath sounds: Normal breath sounds. No wheezing. Abdominal:      General: Bowel sounds are normal. There is no distension. Palpations: Abdomen is soft. There is no mass. Tenderness: There is no abdominal tenderness. There is no guarding. Skin:     General: Skin is warm and dry. Neurological:      General: No focal deficit present. Mental Status: He is alert and oriented to person, place, and time.       Motor: No weakness. Data Review:       Recent Days:  Recent Labs     11/03/20  0724 11/02/20  0852   WBC 9.4 9.4   HGB 11.0* 11.4*   HCT 32.7* 33.3*    187     Recent Labs     11/03/20  0724 11/02/20  0852    140   K 3.5 3.7   * 107   CO2 26 27   * 115*   BUN 6 6   CREA 0.76 0.74   CA 9.0 8.9         Assessment/Plan     1. Acute alcoholic Pancreatitis  Resolved- lipase continues to stay in 200s which is reassuring. Lipase on presentation 10/29/20 >3000. Lipase 11/03/20 is 252.      2. Acute Gout Attack   Uric acid 5.0 on 11/04/2020 - WNL  Colchicine PO for 5 more days   Abstain from alcohol      3. Alcohol abuse  Reports having 7 drinks a day  Counseled patient extensively on completely abstaining from alcohol use      4. Diabetes type 2  Serum glucose 137 this a.m. Resume Metformin for at home regimen     5. HTN  BP was 147/84 this a.m. Likely elevated second to pain. Continue with lisinopril at home.      Comments: Recommended Mr. Shahnaz Reyez to follow up with PCP Dr. Sherman Gutierrez within 3-5 days         Care Plan discussed with: Patient/Family    Total time spent with patient: 45 minutes.     Virginia Li NP